# Patient Record
Sex: MALE | Race: WHITE | NOT HISPANIC OR LATINO | Employment: FULL TIME | URBAN - METROPOLITAN AREA
[De-identification: names, ages, dates, MRNs, and addresses within clinical notes are randomized per-mention and may not be internally consistent; named-entity substitution may affect disease eponyms.]

---

## 2017-01-26 ENCOUNTER — OFFICE VISIT (OUTPATIENT)
Dept: FAMILY MEDICINE CLINIC | Facility: CLINIC | Age: 37
End: 2017-01-26

## 2017-01-26 VITALS
HEART RATE: 76 BPM | SYSTOLIC BLOOD PRESSURE: 110 MMHG | DIASTOLIC BLOOD PRESSURE: 80 MMHG | WEIGHT: 244 LBS | BODY MASS INDEX: 31.32 KG/M2 | RESPIRATION RATE: 16 BRPM | HEIGHT: 74 IN | TEMPERATURE: 98.3 F

## 2017-01-26 DIAGNOSIS — J30.1 NON-SEASONAL ALLERGIC RHINITIS DUE TO POLLEN: ICD-10-CM

## 2017-01-26 DIAGNOSIS — J01.00 SUBACUTE MAXILLARY SINUSITIS: Primary | ICD-10-CM

## 2017-01-26 PROCEDURE — 99214 OFFICE O/P EST MOD 30 MIN: CPT | Performed by: INTERNAL MEDICINE

## 2017-01-26 RX ORDER — AZELASTINE HYDROCHLORIDE, FLUTICASONE PROPIONATE 137; 50 UG/1; UG/1
2 SPRAY, METERED NASAL 2 TIMES DAILY
Qty: 23 G | Refills: 5 | Status: SHIPPED | OUTPATIENT
Start: 2017-01-26 | End: 2018-03-27

## 2017-01-26 RX ORDER — FEXOFENADINE HCL AND PSEUDOEPHEDRINE HCI 60; 120 MG/1; MG/1
1 TABLET, EXTENDED RELEASE ORAL 2 TIMES DAILY
Qty: 30 TABLET | Refills: 1 | Status: SHIPPED | OUTPATIENT
Start: 2017-01-26 | End: 2018-03-27

## 2017-01-26 RX ORDER — AMOXICILLIN AND CLAVULANATE POTASSIUM 875; 125 MG/1; MG/1
1 TABLET, FILM COATED ORAL 2 TIMES DAILY
Qty: 20 TABLET | Refills: 0 | Status: SHIPPED | OUTPATIENT
Start: 2017-01-26 | End: 2017-05-31

## 2017-01-26 NOTE — PROGRESS NOTES
Subjective  Chief complaint is sinus problems  Erasmo Kelley is a 36 y.o. male.     History of Present Illness   Rohit is here today for complaints of sinus problems.  He did have a sinus infection in 2015 treated by Dr. Jacobson.  Since that time he has struggled with intermittent moderate to severe sinus congestion and drainage.  Sometimes this is associated with chills and sweats.  He has again other rounds of antibiotic's including Omnicef which seems to help temporarily.  He has not been good about taking a medicine daily in terms of allergy medicines.  Social history he is a nonsmoker.  He has no prior known history of specific allergens.  The following portions of the patient's history were reviewed and updated as appropriate: allergies, current medications, past medical history, past social history and problem list.    Review of Systems   Constitutional: Positive for chills.   HENT: Positive for congestion, rhinorrhea and sinus pressure. Negative for ear discharge and ear pain.    Respiratory: Negative for cough, chest tightness and shortness of breath.        Objective   Physical Exam   Constitutional: He appears well-developed and well-nourished. No distress.   HENT:   Tympanic membranes are normal.  There is mild cerumen bilaterally which is nonobstructing.  Nasal passages show erythema and dry excoriation as well as congestion.  Oral pharynx shows tonsillar enlargement but no exudate.  Her is some mild tenderness on maxillary palpation.   Eyes: Conjunctivae are normal.   Neck: No thyromegaly present.   Lymphadenopathy:     He has no cervical adenopathy.   Nursing note and vitals reviewed.      Assessment/Plan   Erasmo was seen today for sinus problem.    Diagnoses and all orders for this visit:    Subacute maxillary sinusitis  -     CT Sinus Without Contrast; Future    Non-seasonal allergic rhinitis due to pollen    Other orders  -     amoxicillin-clavulanate (AUGMENTIN) 875-125 MG per tablet; Take 1  tablet by mouth 2 (Two) Times a Day.  -     Azelastine-Fluticasone 137-50 MCG/ACT suspension; 2 sprays into each nostril 2 (Two) Times a Day.  -     fexofenadine-pseudoephedrine (ALLEGRA-D ALLERGY & CONGESTION)  MG per 12 hr tablet; Take 1 tablet by mouth 2 (Two) Times a Day.      Rohit is here today for chronic sinus complaints.  I am going to treat him with some Augmentin as well as Allegra-D and Dymista.  I did encourage him to continue to use the Dymista routinely.  We are going to get a CAT scan of his sinuses and consider whether or not a ear nose and throat are allergy referral is necessary.

## 2017-05-31 ENCOUNTER — OFFICE VISIT (OUTPATIENT)
Dept: FAMILY MEDICINE CLINIC | Facility: CLINIC | Age: 37
End: 2017-05-31

## 2017-05-31 VITALS
HEART RATE: 87 BPM | DIASTOLIC BLOOD PRESSURE: 84 MMHG | HEIGHT: 74 IN | RESPIRATION RATE: 15 BRPM | TEMPERATURE: 98.2 F | WEIGHT: 231 LBS | SYSTOLIC BLOOD PRESSURE: 130 MMHG | BODY MASS INDEX: 29.65 KG/M2

## 2017-05-31 DIAGNOSIS — M75.42 IMPINGEMENT SYNDROME OF LEFT SHOULDER: Primary | ICD-10-CM

## 2017-05-31 PROCEDURE — 99214 OFFICE O/P EST MOD 30 MIN: CPT | Performed by: INTERNAL MEDICINE

## 2017-05-31 RX ORDER — MELOXICAM 15 MG/1
15 TABLET ORAL DAILY
Qty: 30 TABLET | Refills: 0 | Status: SHIPPED | OUTPATIENT
Start: 2017-05-31 | End: 2018-03-27

## 2018-02-08 ENCOUNTER — OFFICE VISIT (OUTPATIENT)
Dept: FAMILY MEDICINE CLINIC | Facility: CLINIC | Age: 38
End: 2018-02-08

## 2018-02-08 VITALS
SYSTOLIC BLOOD PRESSURE: 102 MMHG | HEIGHT: 74 IN | OXYGEN SATURATION: 99 % | DIASTOLIC BLOOD PRESSURE: 78 MMHG | TEMPERATURE: 98.2 F | WEIGHT: 214 LBS | BODY MASS INDEX: 27.46 KG/M2 | HEART RATE: 94 BPM

## 2018-02-08 DIAGNOSIS — R63.1 POLYDIPSIA: ICD-10-CM

## 2018-02-08 DIAGNOSIS — R35.89 POLYURIA: ICD-10-CM

## 2018-02-08 DIAGNOSIS — R63.4 WEIGHT LOSS, UNINTENTIONAL: Primary | ICD-10-CM

## 2018-02-08 LAB
ALBUMIN SERPL-MCNC: 4.7 G/DL (ref 3.5–5.2)
ALBUMIN/GLOB SERPL: 2 G/DL
ALP SERPL-CCNC: 59 U/L (ref 39–117)
ALT SERPL-CCNC: 30 U/L (ref 1–41)
APPEARANCE UR: CLEAR
AST SERPL-CCNC: 15 U/L (ref 1–40)
BACTERIA #/AREA URNS HPF: NORMAL /HPF
BASOPHILS # BLD AUTO: 0.03 10*3/MM3 (ref 0–0.2)
BASOPHILS NFR BLD AUTO: 0.5 % (ref 0–1.5)
BILIRUB SERPL-MCNC: 2.1 MG/DL (ref 0.1–1.2)
BILIRUB UR QL STRIP: NEGATIVE
BUN SERPL-MCNC: 17 MG/DL (ref 6–20)
BUN/CREAT SERPL: 17.5 (ref 7–25)
CALCIUM SERPL-MCNC: 9.8 MG/DL (ref 8.6–10.5)
CASTS URNS MICRO: NORMAL
CHLORIDE SERPL-SCNC: 97 MMOL/L (ref 98–107)
CO2 SERPL-SCNC: 26.9 MMOL/L (ref 22–29)
COLOR UR: YELLOW
CREAT SERPL-MCNC: 0.97 MG/DL (ref 0.76–1.27)
EOSINOPHIL # BLD AUTO: 0.16 10*3/MM3 (ref 0–0.7)
EOSINOPHIL NFR BLD AUTO: 2.8 % (ref 0.3–6.2)
EPI CELLS #/AREA URNS HPF: NORMAL /HPF
ERYTHROCYTE [DISTWIDTH] IN BLOOD BY AUTOMATED COUNT: 13.1 % (ref 11.5–14.5)
GFR SERPLBLD CREATININE-BSD FMLA CKD-EPI: 107 ML/MIN/1.73
GFR SERPLBLD CREATININE-BSD FMLA CKD-EPI: 88 ML/MIN/1.73
GLOBULIN SER CALC-MCNC: 2.4 GM/DL
GLUCOSE SERPL-MCNC: 321 MG/DL (ref 65–99)
GLUCOSE UR QL: (no result)
HBA1C MFR BLD: 12.5 % (ref 4.8–5.6)
HCT VFR BLD AUTO: 44.1 % (ref 40.4–52.2)
HGB BLD-MCNC: 15.6 G/DL (ref 13.7–17.6)
HGB UR QL STRIP: NEGATIVE
IMM GRANULOCYTES # BLD: 0 10*3/MM3 (ref 0–0.03)
IMM GRANULOCYTES NFR BLD: 0 % (ref 0–0.5)
KETONES UR QL STRIP: (no result)
LEUKOCYTE ESTERASE UR QL STRIP: NEGATIVE
LYMPHOCYTES # BLD AUTO: 1.76 10*3/MM3 (ref 0.9–4.8)
LYMPHOCYTES NFR BLD AUTO: 31 % (ref 19.6–45.3)
MCH RBC QN AUTO: 30.7 PG (ref 27–32.7)
MCHC RBC AUTO-ENTMCNC: 35.4 G/DL (ref 32.6–36.4)
MCV RBC AUTO: 86.8 FL (ref 79.8–96.2)
MONOCYTES # BLD AUTO: 0.5 10*3/MM3 (ref 0.2–1.2)
MONOCYTES NFR BLD AUTO: 8.8 % (ref 5–12)
NEUTROPHILS # BLD AUTO: 3.22 10*3/MM3 (ref 1.9–8.1)
NEUTROPHILS NFR BLD AUTO: 56.9 % (ref 42.7–76)
NITRITE UR QL STRIP: NEGATIVE
PH UR STRIP: 6.5 [PH] (ref 5–8)
PLATELET # BLD AUTO: 233 10*3/MM3 (ref 140–500)
POTASSIUM SERPL-SCNC: 4.6 MMOL/L (ref 3.5–5.2)
PROT SERPL-MCNC: 7.1 G/DL (ref 6–8.5)
PROT UR QL STRIP: (no result)
RBC # BLD AUTO: 5.08 10*6/MM3 (ref 4.6–6)
RBC #/AREA URNS HPF: NORMAL /HPF
SODIUM SERPL-SCNC: 138 MMOL/L (ref 136–145)
SP GR UR: (no result) (ref 1–1.03)
T4 FREE SERPL-MCNC: 1.2 NG/DL (ref 0.93–1.7)
TSH SERPL DL<=0.005 MIU/L-ACNC: 3.61 MIU/ML (ref 0.27–4.2)
UROBILINOGEN UR STRIP-MCNC: (no result) MG/DL
WBC # BLD AUTO: 5.67 10*3/MM3 (ref 4.5–10.7)
WBC #/AREA URNS HPF: NORMAL /HPF

## 2018-02-08 PROCEDURE — 99214 OFFICE O/P EST MOD 30 MIN: CPT | Performed by: INTERNAL MEDICINE

## 2018-02-08 NOTE — PROGRESS NOTES
Subjective chief complaint is checkup but also weight loss  Erasmo Kelley is a 37 y.o. male.     History of Present Illness   Rohit is here today for checkup.  I last saw him in January 2017.  At that time his weight was 244 pounds.  Shortly thereafter he began losing his appetite.  His weight now is 214 pounds.  He does report that he is having some increased dry mouth and thirst.  He is urinating more frequently because he is drinking more.  He is getting up several times at night to urinate.  He is also concerned because he passed a kidney stone sometime in July 2017.  There was a initial ultrasound that showed an isoechoic mass on the right kidney.  A follow-up CT scan did not show any mass but did show a subcentimeter renal cyst on the right.  It also showed that he had recently passed a kidney stone.  Pancreas and liver looked okay on this scan.  Prostate gland was not enlarged.  Family history is remarkable for diabetes and thyroid disease.  The following portions of the patient's history were reviewed and updated as appropriate: allergies, current medications, past family history and problem list.    Review of Systems   Constitutional: Positive for appetite change and unexpected weight change.   Endocrine: Positive for polydipsia and polyuria.   Genitourinary: Positive for frequency.       Objective   Physical Exam   Constitutional: He appears well-developed and well-nourished.   HENT:   Head: Normocephalic and atraumatic.   Eyes: Conjunctivae are normal.   Neck: No tracheal deviation present. No thyromegaly present.   Cardiovascular: Normal rate, regular rhythm and normal heart sounds.  Exam reveals no gallop and no friction rub.    No murmur heard.  Pulmonary/Chest: Effort normal and breath sounds normal. No respiratory distress. He has no wheezes. He has no rales.   Abdominal: Soft. Bowel sounds are normal. He exhibits no distension and no mass. There is no tenderness. There is no rebound and no  guarding.   Musculoskeletal: He exhibits no edema.   Lymphadenopathy:     He has no cervical adenopathy.   Psychiatric: He has a normal mood and affect.   Nursing note and vitals reviewed.      Assessment/Plan   Erasmo was seen today for follow-up.    Diagnoses and all orders for this visit:    Weight loss, unintentional  -     CBC & Differential  -     Comprehensive Metabolic Panel  -     TSH+Free T4  -     Hemoglobin A1c  -     Urinalysis With Microscopic - Urine, Clean Catch    Polydipsia  -     Comprehensive Metabolic Panel  -     Hemoglobin A1c  -     Urinalysis With Microscopic - Urine, Clean Catch    Polyuria  -     Comprehensive Metabolic Panel  -     Hemoglobin A1c  -     Urinalysis With Microscopic - Urine, Clean Catch      Rohit is here today for some unintended weight loss and other symptoms that could be consistent with diabetes.  We are going to check on his chemistries as well as a hemoglobin A1c.  I'm also going to check a thyroid.  I'm going to check a urinalysis because of his frequency of urination.  I do not think the cyst on the kidney is anything to worry about.

## 2018-02-22 ENCOUNTER — OFFICE VISIT (OUTPATIENT)
Dept: FAMILY MEDICINE CLINIC | Facility: CLINIC | Age: 38
End: 2018-02-22

## 2018-02-22 VITALS
SYSTOLIC BLOOD PRESSURE: 98 MMHG | OXYGEN SATURATION: 98 % | WEIGHT: 215 LBS | HEIGHT: 74 IN | TEMPERATURE: 98.2 F | HEART RATE: 89 BPM | BODY MASS INDEX: 27.59 KG/M2 | DIASTOLIC BLOOD PRESSURE: 78 MMHG

## 2018-02-22 DIAGNOSIS — E11.9 DIABETES MELLITUS, NEW ONSET (HCC): Primary | ICD-10-CM

## 2018-02-22 PROCEDURE — 99213 OFFICE O/P EST LOW 20 MIN: CPT | Performed by: INTERNAL MEDICINE

## 2018-02-22 RX ORDER — BLOOD-GLUCOSE METER
KIT MISCELLANEOUS
Refills: 0 | COMMUNITY
Start: 2018-02-09 | End: 2018-03-27 | Stop reason: ALTCHOICE

## 2018-02-22 NOTE — PROGRESS NOTES
Subjective chief complaint is follow-up for new onset diabetes  Erasmo Kelley is a 37 y.o. male.     History of Present Illness   Rohit is here today for follow-up.  It is been 2 weeks since we started him on some Janumet XR.  He has been trying to watch his diet closely.  He has been checking his sugars multiple times daily.  His morning sugars seem to be approximately 160.  Most of his after meal sugars seem to be near 200.  A1c initially was 12.3.  He cannot recall any type of episode of abdominal pain.  He was in the emergency room for a kidney stone in July 2017.  His blood sugar at that time was 99.  His urinalysis showed no sugar in the urine.  The CAT scan of the abdomen showed a normal-appearing pancreas.  The following portions of the patient's history were reviewed and updated as appropriate: allergies, current medications, past medical history and problem list.    Review of Systems   Eyes: Positive for visual disturbance.   Gastrointestinal: Negative for abdominal pain and diarrhea.       Objective   Physical Exam   Constitutional: He appears well-developed and well-nourished.   Abdominal: Soft. Bowel sounds are normal. He exhibits no distension. There is tenderness.   He is tender in the midepigastrium.  No rebounding or guarding.   Nursing note and vitals reviewed.      Assessment/Plan   Erasmo was seen today for follow-up.    Diagnoses and all orders for this visit:    Diabetes mellitus, new onset  -     Ambulatory Referral to Diabetic Education  -     Ambulatory Referral to Endocrinology  -     C-Peptide  -     Glutamic Acid Decarboxylase  -     Insulin, Random  -     Anti-islet Cell Antibody  -     CT Abdomen Pelvis With Contrast; Future    Other orders  -     SITagliptin-MetFORMIN HCl ER (JANUMET XR) 100-1000 MG tablet; Take 1 tablet by mouth Daily.      Rohit is here today for follow-up.  His sugars seem to have come down initially fairly nicely.  I suspect we need to give him a little bit  more time on the current dose of medication.  I am going to refer him to a diabetic educator and also an endocrinologist.  In the interim I'm going to check some antibody studies and a CAT scan of the abdomen and pelvis.

## 2018-02-28 ENCOUNTER — TELEPHONE (OUTPATIENT)
Dept: FAMILY MEDICINE CLINIC | Facility: CLINIC | Age: 38
End: 2018-02-28

## 2018-02-28 ENCOUNTER — HOSPITAL ENCOUNTER (OUTPATIENT)
Dept: CT IMAGING | Facility: HOSPITAL | Age: 38
Discharge: HOME | End: 2018-02-28
Attending: INTERNAL MEDICINE | Admitting: INTERNAL MEDICINE

## 2018-02-28 DIAGNOSIS — E11.9 DIABETES MELLITUS, NEW ONSET (HCC): ICD-10-CM

## 2018-02-28 LAB
C PEPTIDE SERPL-MCNC: 2 NG/ML (ref 1.1–4.4)
GAD65 AB SER IA-ACNC: 689.4 U/ML (ref 0–5)
INSULIN SERPL-ACNC: 4 UIU/ML
PANC ISLET CELL AB TITR SER: NEGATIVE {TITER}

## 2018-02-28 PROCEDURE — 0 IOPAMIDOL 61 % SOLUTION: Performed by: INTERNAL MEDICINE

## 2018-02-28 PROCEDURE — 74177 CT ABD & PELVIS W/CONTRAST: CPT

## 2018-02-28 PROCEDURE — 0 DIATRIZOATE MEGLUMINE & SODIUM PER 1 ML: Performed by: INTERNAL MEDICINE

## 2018-02-28 RX ADMIN — DIATRIZOATE MEGLUMINE AND DIATRIZOATE SODIUM 30 ML: 660; 100 LIQUID ORAL; RECTAL at 08:45

## 2018-02-28 RX ADMIN — IOPAMIDOL 85 ML: 612 INJECTION, SOLUTION INTRAVENOUS at 09:40

## 2018-02-28 NOTE — TELEPHONE ENCOUNTER
----- Message from Deepthi Andrea sent at 2/28/2018  9:56 AM EST -----  PATIENT CALLED, HAD A CAT SCAN THIS MORNING, THE RADIOLOGIST GAVE HIM A PIECE OF PAPER SAYING THAT BECAUSE HE TAKES JANUMET - HE NEEDS TO TALK TO HIS PCP ABOUT IT CAUSE HE MAY NEED TO BE OFF THE MED FOR 2 DAYS DUE TO THE DYE THEY USE DURING THE TESTS. PLEASE RETURN HIS CALL       [phone] PATIENT CELL   Advised that was okay to be off the Janumet for 2 days.

## 2018-03-27 ENCOUNTER — OFFICE VISIT (OUTPATIENT)
Dept: ENDOCRINOLOGY | Age: 38
End: 2018-03-27

## 2018-03-27 VITALS
SYSTOLIC BLOOD PRESSURE: 100 MMHG | WEIGHT: 217.6 LBS | OXYGEN SATURATION: 98 % | BODY MASS INDEX: 27.93 KG/M2 | HEART RATE: 74 BPM | HEIGHT: 74 IN | DIASTOLIC BLOOD PRESSURE: 66 MMHG

## 2018-03-27 DIAGNOSIS — E10.9 AUTOIMMUNE DIABETES (HCC): Primary | ICD-10-CM

## 2018-03-27 PROCEDURE — 99204 OFFICE O/P NEW MOD 45 MIN: CPT | Performed by: INTERNAL MEDICINE

## 2018-03-27 RX ORDER — CHLORHEXIDINE GLUCONATE 0.12 MG/ML
RINSE ORAL
Refills: 0 | COMMUNITY
Start: 2018-03-14 | End: 2018-03-27

## 2018-03-27 NOTE — PROGRESS NOTES
Subjective   Erasmo Kelley is a 37 y.o. male.     New pt ref by dr Ravi Cox for dm / testing bs 4x day has been out of test strips  / no recent last dm eye exam  / last dm foot exam today with dr Tomlinson       Diabetes        Patient is a 37-year-old male referred for diabetic consultation by Dr. Cox.    He was diagnosed to have diabetes mellitus in February 2018 with the 5-8 month history of increased thirst, polyuria, and weight loss of 35 pounds.  Random glucose done in February 2018 was elevated at 321 and hemoglobin A1c was 12.50%.  DOMINGO antibody was elevated at 689 units per mL.    He was started on Janumet /1000 mg once a day in February 2018.  Fasting glucose at home has ranged from 120-130.  Random glucose runs less than 190 except on days when he goes off his diet.  He has not seen a dietitian and diabetes educator.  He has not lost any more weight since Janumet was started.    His vision is no longer blurry.  His last eye examination was many years ago.  He denies any numbness, tingling or burning sensation in his hands or feet.    He has no history of hypertension or hyperlipidemia.  His last meal was at 11 AM.    He does not smoke cigarettes.  He drinks 2-25 beers per week depending on social medications.  He denies alcohol-related disease.  He denies drug abuse.  The following portions of the patient's history were reviewed and updated as appropriate: allergies, current medications, past family history, past medical history, past social history, past surgical history and problem list.    Review of Systems   Constitutional: Negative.    HENT: Negative.    Eyes: Negative.    Respiratory: Negative.    Cardiovascular: Negative.    Gastrointestinal: Negative.    Endocrine: Negative.    Genitourinary: Negative.    Musculoskeletal: Negative.    Skin: Negative.    Allergic/Immunologic: Negative.    Neurological: Negative.    Hematological: Negative.    Psychiatric/Behavioral: Negative.   "      Objective      Vitals:    03/27/18 1250   BP: 100/66   BP Location: Right arm   Patient Position: Sitting   Cuff Size: Large Adult   Pulse: 74   SpO2: 98%   Weight: 98.7 kg (217 lb 9.6 oz)   Height: 188 cm (74.02\")     Physical Exam   Constitutional: He is oriented to person, place, and time. He appears well-developed and well-nourished. No distress.   HENT:   Head: Normocephalic.   Nose: Nose normal.   Mouth/Throat: No oropharyngeal exudate.   Eyes: Conjunctivae and EOM are normal. Right eye exhibits no discharge. Left eye exhibits no discharge. No scleral icterus.   Neck: Normal range of motion. No JVD present. No tracheal deviation present. No thyromegaly present.   Cardiovascular: Normal rate, regular rhythm, normal heart sounds and intact distal pulses.  Exam reveals no gallop and no friction rub.    No murmur heard.  Pulmonary/Chest: Effort normal and breath sounds normal. No respiratory distress. He has no wheezes. He has no rales.   Abdominal: Soft. Bowel sounds are normal. He exhibits no distension. There is no tenderness. There is no guarding.   Musculoskeletal: Normal range of motion. He exhibits no edema, tenderness or deformity.   Lymphadenopathy:     He has no cervical adenopathy.   Neurological: He is alert and oriented to person, place, and time. He has normal reflexes.   Intact light touch   Skin: Skin is warm and dry.   Psychiatric: He has a normal mood and affect. His behavior is normal.     Office Visit on 02/22/2018   Component Date Value Ref Range Status   • C-Peptide 02/28/2018 2.0  1.1 - 4.4 ng/mL Final   • DOMINGO-65 02/28/2018 689.4* 0.0 - 5.0 U/mL Final   • Insulin 02/28/2018 4.0  uIU/mL Final    Comment: Reference Range:  Pubertal Children and Adults (fasting):  < or = 17     • Islet Cell Ab 02/28/2018 Negative  Neg:<1:1 Final     Assessment/Plan   Erasmo was seen today for diabetes.    Diagnoses and all orders for this visit:    Autoimmune diabetes  -     Comprehensive Metabolic " Panel  -     Lipid Panel  -     Microalbumin / Creatinine Urine Ratio - Urine, Clean Catch  -     Fructosamine  -     glucose blood test strip; One touch Verio Strips.  Use QID  -     Ambulatory Referral to Diabetic Education    Other orders  -     Discontinue: ONE TOUCH ULTRA TEST test strip; Dx code E11.9 testing 4 x day  -     ONETOUCH DELICA LANCETS FINE misc; Dx code E11.9 Testing bs 4 x day      Patient has type 1 diabetes with residual insulin secretion.    Appointment with diabetes educator to instruct on insulin use  Continue on Janumet XR for now.  He will eventually need to switch to insulin.  Check Fructosamine, C-peptide, and urine microalbumin.  Advised to have eye exam.  He has been advised to reduce beer intake to less than 4 per day.    RTC 3 mos    Send copy of my note and labs to Dr. Cox

## 2018-03-28 LAB
ALBUMIN SERPL-MCNC: 4.7 G/DL (ref 3.5–5.2)
ALBUMIN/CREAT UR: <3.3 MG/G CREAT (ref 0–30)
ALBUMIN/GLOB SERPL: 1.9 G/DL
ALP SERPL-CCNC: 42 U/L (ref 39–117)
ALT SERPL-CCNC: 19 U/L (ref 1–41)
AST SERPL-CCNC: 15 U/L (ref 1–40)
BILIRUB SERPL-MCNC: 1.3 MG/DL (ref 0.1–1.2)
BUN SERPL-MCNC: 15 MG/DL (ref 6–20)
BUN/CREAT SERPL: 14.7 (ref 7–25)
CALCIUM SERPL-MCNC: 10.1 MG/DL (ref 8.6–10.5)
CHLORIDE SERPL-SCNC: 101 MMOL/L (ref 98–107)
CHOLEST SERPL-MCNC: 206 MG/DL (ref 0–200)
CO2 SERPL-SCNC: 25.2 MMOL/L (ref 22–29)
CREAT SERPL-MCNC: 1.02 MG/DL (ref 0.76–1.27)
CREAT UR-MCNC: 91.5 MG/DL
FRUCTOSAMINE SERPL-SCNC: 320 UMOL/L (ref 0–285)
GFR SERPLBLD CREATININE-BSD FMLA CKD-EPI: 101 ML/MIN/1.73
GFR SERPLBLD CREATININE-BSD FMLA CKD-EPI: 83 ML/MIN/1.73
GLOBULIN SER CALC-MCNC: 2.5 GM/DL
GLUCOSE SERPL-MCNC: 112 MG/DL (ref 65–99)
HDLC SERPL-MCNC: 47 MG/DL (ref 40–60)
INTERPRETATION: NORMAL
LDLC SERPL CALC-MCNC: 128 MG/DL (ref 0–100)
MICROALBUMIN UR-MCNC: <3 UG/ML
POTASSIUM SERPL-SCNC: 4.2 MMOL/L (ref 3.5–5.2)
PROT SERPL-MCNC: 7.2 G/DL (ref 6–8.5)
SODIUM SERPL-SCNC: 142 MMOL/L (ref 136–145)
TRIGL SERPL-MCNC: 154 MG/DL (ref 0–150)
VLDLC SERPL CALC-MCNC: 30.8 MG/DL (ref 5–40)

## 2018-04-20 ENCOUNTER — OFFICE VISIT (OUTPATIENT)
Dept: FAMILY MEDICINE CLINIC | Facility: CLINIC | Age: 38
End: 2018-04-20

## 2018-04-20 VITALS
WEIGHT: 215.4 LBS | TEMPERATURE: 97.7 F | OXYGEN SATURATION: 100 % | RESPIRATION RATE: 16 BRPM | HEART RATE: 92 BPM | HEIGHT: 74 IN | BODY MASS INDEX: 27.64 KG/M2

## 2018-04-20 DIAGNOSIS — J01.00 SUBACUTE MAXILLARY SINUSITIS: Primary | ICD-10-CM

## 2018-04-20 PROCEDURE — 99213 OFFICE O/P EST LOW 20 MIN: CPT | Performed by: FAMILY MEDICINE

## 2018-04-20 RX ORDER — FLUTICASONE PROPIONATE 50 MCG
2 SPRAY, SUSPENSION (ML) NASAL DAILY
Qty: 15.8 ML | Refills: 1 | Status: SHIPPED | OUTPATIENT
Start: 2018-04-20 | End: 2018-08-14

## 2018-04-20 RX ORDER — AMOXICILLIN 500 MG/1
500 CAPSULE ORAL 3 TIMES DAILY
Qty: 30 CAPSULE | Refills: 0 | Status: SHIPPED | OUTPATIENT
Start: 2018-04-20 | End: 2018-08-14

## 2018-04-20 NOTE — PROGRESS NOTES
HPI  Erasmo Kelley is a 38 y.o. male who is here for 2 week history of sinus pain and congestion especially above and below the left eye.  Has been using Allegra-D as well as some old Flonase.  Yesterday pain was very severe and last night blew out discolored mucus with blood.  Has also been taking ibuprofen as needed for headache etc.       Review of Systems   Constitutional: Positive for fatigue. Negative for chills, diaphoresis and fever.   HENT: Positive for congestion, sinus pain and sinus pressure. Negative for sore throat and trouble swallowing.    Respiratory: Negative for cough and shortness of breath.    Neurological: Positive for headaches.         No past medical history on file.    Past Surgical History:   Procedure Laterality Date   • APPENDECTOMY     • HERNIA REPAIR     • WISDOM TOOTH EXTRACTION         Family History   Problem Relation Age of Onset   • Thyroid disease Mother    • Diabetes Father    • Heart disease Father    • Hypertension Father    • Hyperlipidemia Father    • Hyperlipidemia Brother    • Heart disease Brother    • Diabetes Paternal Grandmother        Social History     Social History   • Marital status:      Spouse name: N/A   • Number of children: N/A   • Years of education: N/A     Occupational History   • mechanical contractor      Social History Main Topics   • Smoking status: Never Smoker   • Smokeless tobacco: Not on file   • Alcohol use Yes      Comment: 2-30 beers/ week    • Drug use: No   • Sexual activity: Not on file     Other Topics Concern   • Not on file     Social History Narrative   • No narrative on file         Physical Exam   Constitutional: He is oriented to person, place, and time. He appears well-developed and well-nourished.   HENT:   Head: Normocephalic and atraumatic.   Right Ear: Hearing, tympanic membrane and ear canal normal.   Left Ear: Hearing, tympanic membrane and ear canal normal.   Nose: No mucosal edema. No epistaxis. Left sinus exhibits  maxillary sinus tenderness.   Eyes: EOM are normal. Pupils are equal, round, and reactive to light.   Neck: Normal range of motion. Neck supple.   Cardiovascular: Normal rate.    Pulmonary/Chest: Effort normal. No respiratory distress.   Lymphadenopathy:     He has no cervical adenopathy.   Neurological: He is alert and oriented to person, place, and time.   Skin: Skin is warm and dry. No rash noted.   Psychiatric: He has a normal mood and affect. His behavior is normal. Judgment and thought content normal.   Nursing note and vitals reviewed.        Assessment/Plan    Erasmo was seen today for sinus problem.    Diagnoses and all orders for this visit:    Subacute maxillary sinusitis  -     fluticasone (FLONASE) 50 MCG/ACT nasal spray; 2 sprays into each nostril Daily.  -     amoxicillin (AMOXIL) 500 MG capsule; Take 1 capsule by mouth 3 (Three) Times a Day.      Patient presents with probable left maxillary and possible frontal sinusitis.  Reports discolored bloody nasal discharge and severe pain yesterday though much better today.  Patient is diabetic.  Will give empiric antibiotic therapy for acute sinusitis and recommend continuing Flonase nasal spray.  Call if worsens or if not significantly improved by next week.    This note includes information entered using a voice recognition dictation system.  Though reviewed, some nonsensible errors may remain.

## 2018-08-14 ENCOUNTER — OFFICE VISIT (OUTPATIENT)
Dept: ENDOCRINOLOGY | Age: 38
End: 2018-08-14

## 2018-08-14 VITALS
WEIGHT: 216 LBS | HEART RATE: 83 BPM | HEIGHT: 74 IN | BODY MASS INDEX: 27.72 KG/M2 | OXYGEN SATURATION: 97 % | DIASTOLIC BLOOD PRESSURE: 72 MMHG | SYSTOLIC BLOOD PRESSURE: 108 MMHG

## 2018-08-14 DIAGNOSIS — E10.9 AUTOIMMUNE DIABETES (HCC): Primary | ICD-10-CM

## 2018-08-14 PROCEDURE — 99214 OFFICE O/P EST MOD 30 MIN: CPT | Performed by: INTERNAL MEDICINE

## 2018-08-14 NOTE — PROGRESS NOTES
Subjective   Erasmo Kelley is a 38 y.o. male.     F/u for dm 1/testing bs 2-4  x day / pt has not had a recent eye exam/ last dm foot exam 3/2718 with dr Tomlinson       Diabetes           Patient is a 37-year-old male referred for diabetic consultation by Dr. Cox.     He was diagnosed to have diabetes mellitus in February 2018 with the 5-8 month history of increased thirst, polyuria, and weight loss of 35 pounds.  Random glucose done in February 2018 was elevated at 321 and hemoglobin A1c was 12.50%.  DOMINGO antibody was elevated at 689 units per mL.     He was started on Janumet /1000 mg once a day in February 2018.  Fasting glucose at home has ranged from 113-143.  He has not seen a dietitian and diabetes educator.  He has not lost any more weight since Janumet was started.     His vision is no longer blurry.  His last eye examination was many years ago.  He denies any numbness, tingling or burning sensation in his hands or feet.  Urine microalbumin was normal in March 2018.     He has no history of hypertension.   He has hyperlipidemia and is on diet alone.  His last meal was at 1 PM.     He does not smoke cigarettes.  He drinks 2-25 beers per week depending on social medications.  He denies alcohol-related disease.  He denies drug abuse.    The following portions of the patient's history were reviewed and updated as appropriate: allergies, current medications, past family history, past medical history, past social history, past surgical history and problem list.    Review of Systems   Constitutional: Negative.    HENT: Negative.    Eyes: Negative.    Respiratory: Negative.    Cardiovascular: Negative.    Gastrointestinal: Negative.    Endocrine: Negative.    Genitourinary: Negative.    Musculoskeletal: Negative.    Skin: Negative.    Allergic/Immunologic: Negative.    Neurological: Negative.    Hematological: Negative.    Psychiatric/Behavioral: Negative.        Objective      Vitals:    08/14/18 1419  "  BP: 108/72   BP Location: Right arm   Patient Position: Sitting   Cuff Size: Large Adult   Pulse: 83   SpO2: 97%   Weight: 98 kg (216 lb)   Height: 188 cm (74.02\")     Physical Exam   Constitutional: He is oriented to person, place, and time. He appears well-developed and well-nourished. No distress.   HENT:   Head: Normocephalic.   Nose: Nose normal.   Mouth/Throat: No oropharyngeal exudate.   Eyes: Conjunctivae and EOM are normal. Right eye exhibits no discharge. Left eye exhibits no discharge.   Neck: Neck supple. No JVD present. No tracheal deviation present. No thyromegaly present.   Cardiovascular: Normal rate, regular rhythm, normal heart sounds and intact distal pulses.  Exam reveals no gallop and no friction rub.    No murmur heard.  Pulmonary/Chest: Effort normal and breath sounds normal. No respiratory distress. He has no wheezes. He has no rales.   Abdominal: Soft. Bowel sounds are normal. He exhibits no distension and no mass. There is no tenderness. There is no guarding.   Musculoskeletal: Normal range of motion. He exhibits no edema, tenderness or deformity.   Lymphadenopathy:     He has no cervical adenopathy.   Neurological: He is alert and oriented to person, place, and time. He displays normal reflexes. He exhibits normal muscle tone.   Skin: Skin is warm and dry. No rash noted. No erythema.   Psychiatric: He has a normal mood and affect. His behavior is normal.     Office Visit on 03/27/2018   Component Date Value Ref Range Status   • Glucose 03/27/2018 112* 65 - 99 mg/dL Final   • BUN 03/27/2018 15  6 - 20 mg/dL Final   • Creatinine 03/27/2018 1.02  0.76 - 1.27 mg/dL Final   • eGFR Non  Am 03/27/2018 83  >60 mL/min/1.73 Final   • eGFR African Am 03/27/2018 101  >60 mL/min/1.73 Final   • BUN/Creatinine Ratio 03/27/2018 14.7  7.0 - 25.0 Final   • Sodium 03/27/2018 142  136 - 145 mmol/L Final   • Potassium 03/27/2018 4.2  3.5 - 5.2 mmol/L Final   • Chloride 03/27/2018 101  98 - 107 mmol/L " Final   • Total CO2 03/27/2018 25.2  22.0 - 29.0 mmol/L Final   • Calcium 03/27/2018 10.1  8.6 - 10.5 mg/dL Final   • Total Protein 03/27/2018 7.2  6.0 - 8.5 g/dL Final   • Albumin 03/27/2018 4.70  3.50 - 5.20 g/dL Final   • Globulin 03/27/2018 2.5  gm/dL Final   • A/G Ratio 03/27/2018 1.9  g/dL Final   • Total Bilirubin 03/27/2018 1.3* 0.1 - 1.2 mg/dL Final   • Alkaline Phosphatase 03/27/2018 42  39 - 117 U/L Final   • AST (SGOT) 03/27/2018 15  1 - 40 U/L Final   • ALT (SGPT) 03/27/2018 19  1 - 41 U/L Final   • Total Cholesterol 03/27/2018 206* 0 - 200 mg/dL Final   • Triglycerides 03/27/2018 154* 0 - 150 mg/dL Final   • HDL Cholesterol 03/27/2018 47  40 - 60 mg/dL Final   • VLDL Cholesterol 03/27/2018 30.8  5 - 40 mg/dL Final   • LDL Cholesterol  03/27/2018 128* 0 - 100 mg/dL Final   • Creatinine, Urine 03/27/2018 91.5  Not Estab. mg/dL Final   • Microalbumin, Urine 03/27/2018 <3.0  Not Estab. ug/mL Final   • Microalbumin/Creatinine Ratio 03/27/2018 <3.3  0.0 - 30.0 mg/g creat Final   • Fructosamine 03/27/2018 320* 0 - 285 umol/L Final    Comment: Published reference interval for apparently healthy subjects  between age 20 and 60 is 205 - 285 umol/L and in a poorly  controlled diabetic population is 228 - 563 umol/L with a  mean of 396 umol/L.     • Interpretation 03/27/2018 Note   Final    Supplemental report is available.     Assessment/Plan   Erasmo was seen today for diabetes.    Diagnoses and all orders for this visit:    Autoimmune diabetes (CMS/[facility])  -     Comprehensive Metabolic Panel  -     Hemoglobin A1c  -     IA-2 Autoantibodies  -     Anti-islet Cell Antibody  -     Insulin Antibody      Continue Janumet XR.  Patient has autoimmune diabetes and may eventually stop making insulin and switch over to injectable insulin.  Flu vaccine this fall    Send copy my notes and labs to Dr. Cox.    RTC 4 mos.

## 2018-08-22 PROBLEM — E13.9 LADA (LATENT AUTOIMMUNE DIABETES MELLITUS IN ADULTS) (HCC): Status: ACTIVE | Noted: 2018-02-22

## 2018-08-22 LAB
ALBUMIN SERPL-MCNC: 4.8 G/DL (ref 3.5–5.2)
ALBUMIN/GLOB SERPL: 1.9 G/DL
ALP SERPL-CCNC: 43 U/L (ref 39–117)
ALT SERPL-CCNC: 21 U/L (ref 1–41)
AST SERPL-CCNC: 13 U/L (ref 1–40)
BILIRUB SERPL-MCNC: 1.4 MG/DL (ref 0.1–1.2)
BUN SERPL-MCNC: 17 MG/DL (ref 6–20)
BUN/CREAT SERPL: 12 (ref 7–25)
CALCIUM SERPL-MCNC: 9.3 MG/DL (ref 8.6–10.5)
CHLORIDE SERPL-SCNC: 99 MMOL/L (ref 98–107)
CO2 SERPL-SCNC: 26.1 MMOL/L (ref 22–29)
CREAT SERPL-MCNC: 1.42 MG/DL (ref 0.76–1.27)
GLOBULIN SER CALC-MCNC: 2.5 GM/DL
GLUCOSE SERPL-MCNC: 99 MG/DL (ref 65–99)
HBA1C MFR BLD: 5.73 % (ref 4.8–5.6)
INSULIN AB SER-ACNC: <5 UU/ML
ISLET CELL512 AB SER-ACNC: 4.4 U/ML
PANC ISLET CELL AB TITR SER: NEGATIVE {TITER}
POTASSIUM SERPL-SCNC: 4.3 MMOL/L (ref 3.5–5.2)
PROT SERPL-MCNC: 7.3 G/DL (ref 6–8.5)
SODIUM SERPL-SCNC: 141 MMOL/L (ref 136–145)

## 2018-08-24 DIAGNOSIS — R79.89 ELEVATED SERUM CREATININE: Primary | ICD-10-CM

## 2018-08-27 ENCOUNTER — RESULTS ENCOUNTER (OUTPATIENT)
Dept: ENDOCRINOLOGY | Age: 38
End: 2018-08-27

## 2018-08-27 DIAGNOSIS — R79.89 ELEVATED SERUM CREATININE: ICD-10-CM

## 2018-09-17 RX ORDER — SITAGLIPTIN AND METFORMIN HYDROCHLORIDE 1000; 100 MG/1; MG/1
TABLET, FILM COATED, EXTENDED RELEASE ORAL
Qty: 30 TABLET | Refills: 0 | Status: SHIPPED | OUTPATIENT
Start: 2018-09-17 | End: 2018-11-27 | Stop reason: SDUPTHER

## 2018-11-30 DIAGNOSIS — E10.9 AUTOIMMUNE DIABETES (HCC): ICD-10-CM

## 2019-01-31 ENCOUNTER — OFFICE VISIT (OUTPATIENT)
Dept: ENDOCRINOLOGY | Age: 39
End: 2019-01-31

## 2019-01-31 VITALS
HEIGHT: 74 IN | HEART RATE: 84 BPM | SYSTOLIC BLOOD PRESSURE: 102 MMHG | OXYGEN SATURATION: 98 % | WEIGHT: 220.6 LBS | DIASTOLIC BLOOD PRESSURE: 76 MMHG | BODY MASS INDEX: 28.31 KG/M2

## 2019-01-31 DIAGNOSIS — E10.9 AUTOIMMUNE DIABETES (HCC): Primary | ICD-10-CM

## 2019-01-31 DIAGNOSIS — E10.9 TYPE 1 DIABETES MELLITUS WITHOUT COMPLICATION (HCC): ICD-10-CM

## 2019-01-31 PROCEDURE — 99214 OFFICE O/P EST MOD 30 MIN: CPT | Performed by: INTERNAL MEDICINE

## 2019-01-31 RX ORDER — PEN NEEDLE, DIABETIC 30 GX3/16"
1 NEEDLE, DISPOSABLE MISCELLANEOUS 4 TIMES DAILY
Qty: 200 EACH | Refills: 3 | Status: SHIPPED | OUTPATIENT
Start: 2019-01-31 | End: 2019-04-16 | Stop reason: SDUPTHER

## 2019-01-31 NOTE — PROGRESS NOTES
Subjective   Erasmo Kelley is a 38 y.o. male.     F/u for dm 1/ testing bs 2-4 x day / no recent eye exam / last dm foot exam today with dr Christy / flu vaccine declined       Diabetes   He presents for his follow-up diabetic visit. He has type 1 diabetes mellitus.      Patient is a 38-year-old male referred for diabetic consultation by Dr. Cox.     He was diagnosed to have diabetes mellitus in February 2018 with the 5-8 month history of increased thirst, polyuria, and weight loss of 35 pounds.  Random glucose done in February 2018 was elevated at 321 and hemoglobin A1c was 12.50%.  DOMINGO antibody was elevated at 689 units per mL.     He was started on Janumet /1000 mg once a day in February 2018.  Blood sugars started running higher 2 months ago.  Fasting glucose at home has ranged from 196-216.  -290.  He has not seen a dietitian and diabetes educator.  He has gained 2 lbs since 8/18     His vision is no longer blurry.  He has had an eye exam.   He denies any numbness, tingling or burning sensation in his hands or feet.  Urine microalbumin was normal in March 2018.     He has no history of hypertension.   He has hyperlipidemia and is on diet alone.  His last meal was at 1 PM.     He does not smoke cigarettes.  He drinks 6-8 beers per week depending on social occasion.  He denies alcohol-related disease.  He denies drug abuse.    The following portions of the patient's history were reviewed and updated as appropriate: allergies, current medications, past family history, past medical history, past social history, past surgical history and problem list.    Review of Systems   Constitutional: Negative.    Eyes: Negative.    Respiratory: Negative.    Cardiovascular: Negative.    Gastrointestinal: Negative.    Endocrine: Negative.    Genitourinary: Negative.    Musculoskeletal: Negative.    Skin: Negative.    Allergic/Immunologic: Negative.    Neurological: Negative.    Hematological: Negative.   "  Psychiatric/Behavioral: Negative.        Objective      Vitals:    01/31/19 1432   BP: 102/76   BP Location: Left arm   Patient Position: Sitting   Cuff Size: Large Adult   Pulse: 84   SpO2: 98%   Weight: 100 kg (220 lb 9.6 oz)   Height: 188 cm (74.02\")     Physical Exam   Constitutional: He is oriented to person, place, and time. He appears well-developed and well-nourished. No distress.   HENT:   Head: Normocephalic.   Nose: Nose normal.   Mouth/Throat: Oropharynx is clear and moist. No oropharyngeal exudate.   Eyes: Conjunctivae and EOM are normal. Right eye exhibits no discharge. Left eye exhibits no discharge. No scleral icterus.   Neck: Neck supple. No JVD present. No tracheal deviation present. No thyromegaly present.   Cardiovascular: Normal rate, regular rhythm, normal heart sounds and intact distal pulses. Exam reveals no gallop and no friction rub.   No murmur heard.  Pulmonary/Chest: Effort normal and breath sounds normal. No stridor. No respiratory distress. He has no wheezes. He has no rales. He exhibits no tenderness.   Abdominal: Soft. Bowel sounds are normal. He exhibits no distension and no mass. There is no tenderness. There is no rebound and no guarding.   Musculoskeletal: Normal range of motion. He exhibits no edema, tenderness or deformity.   Lymphadenopathy:     He has no cervical adenopathy.   Neurological: He is alert and oriented to person, place, and time. He displays normal reflexes. He exhibits normal muscle tone. Coordination normal.   Intact light touch on lower ext   Skin: Skin is warm.   Psychiatric: He has a normal mood and affect. His behavior is normal.     Office Visit on 08/14/2018   Component Date Value Ref Range Status   • Glucose 08/14/2018 99  65 - 99 mg/dL Final   • BUN 08/14/2018 17  6 - 20 mg/dL Final   • Creatinine 08/14/2018 1.42* 0.76 - 1.27 mg/dL Final   • eGFR Non  Am 08/14/2018 56* >60 mL/min/1.73 Final   • eGFR African Am 08/14/2018 68  >60 mL/min/1.73 " Final   • BUN/Creatinine Ratio 08/14/2018 12.0  7.0 - 25.0 Final   • Sodium 08/14/2018 141  136 - 145 mmol/L Final   • Potassium 08/14/2018 4.3  3.5 - 5.2 mmol/L Final   • Chloride 08/14/2018 99  98 - 107 mmol/L Final   • Total CO2 08/14/2018 26.1  22.0 - 29.0 mmol/L Final   • Calcium 08/14/2018 9.3  8.6 - 10.5 mg/dL Final   • Total Protein 08/14/2018 7.3  6.0 - 8.5 g/dL Final   • Albumin 08/14/2018 4.80  3.50 - 5.20 g/dL Final   • Globulin 08/14/2018 2.5  gm/dL Final   • A/G Ratio 08/14/2018 1.9  g/dL Final   • Total Bilirubin 08/14/2018 1.4* 0.1 - 1.2 mg/dL Final   • Alkaline Phosphatase 08/14/2018 43  39 - 117 U/L Final   • AST (SGOT) 08/14/2018 13  1 - 40 U/L Final   • ALT (SGPT) 08/14/2018 21  1 - 41 U/L Final   • Hemoglobin A1C 08/14/2018 5.73* 4.80 - 5.60 % Final    Comment: Hemoglobin A1C Ranges:  Increased Risk for Diabetes  5.7% to 6.4%  Diabetes                     >= 6.5%  Diabetic Goal                < 7.0%     • IA-2 Autoantibodies 08/14/2018 4.4* U/mL Final    Comment: Reference Range:  <1.0        Negative  > or = 1.0  Positive     • Islet Cell Ab 08/14/2018 Negative  Neg:<1:1 Final   • Insulin AutoAb 08/14/2018 <5.0  uU/mL Final    Comment: This test is also known as insulin autoantibody or IAA.  Reference Range:  <5.0        Negative  > or = 5.0  Positive       Assessment/Plan   Erasmo was seen today for diabetes.    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  -     Insulin Pen Needle (PEN NEEDLES) 30G X 5 MM misc; 1  each 4 (Four) Times a Day.      Discontinue Janumet XR.  Start Tresiba 20 units once a day and NovoLog 5 units with each meal.  Check blood sugar before meals and at bedtime and call with results in one week.  Appointment with Francisca for diabetes education.    RTC 4 mos    Send copy of my note to Dr. Cox

## 2019-02-01 PROBLEM — E10.9 TYPE 1 DIABETES MELLITUS WITHOUT COMPLICATION (HCC): Status: ACTIVE | Noted: 2018-02-22

## 2019-02-05 LAB
ALBUMIN SERPL-MCNC: 5 G/DL (ref 3.5–5.2)
ALBUMIN/GLOB SERPL: 2.3 G/DL
ALP SERPL-CCNC: 43 U/L (ref 39–117)
ALT SERPL-CCNC: 23 U/L (ref 1–41)
AST SERPL-CCNC: 15 U/L (ref 1–40)
BILIRUB SERPL-MCNC: 2.4 MG/DL (ref 0.1–1.2)
BUN SERPL-MCNC: 16 MG/DL (ref 6–20)
BUN/CREAT SERPL: 14.4 (ref 7–25)
C PEPTIDE SERPL-MCNC: 1.8 NG/ML (ref 1.1–4.4)
CALCIUM SERPL-MCNC: 10.3 MG/DL (ref 8.6–10.5)
CHLORIDE SERPL-SCNC: 101 MMOL/L (ref 98–107)
CHOLEST SERPL-MCNC: 207 MG/DL (ref 0–200)
CO2 SERPL-SCNC: 27.7 MMOL/L (ref 22–29)
CREAT SERPL-MCNC: 1.11 MG/DL (ref 0.76–1.27)
GAD65 AB SER IA-ACNC: 752 U/ML (ref 0–5)
GLOBULIN SER CALC-MCNC: 2.2 GM/DL
GLUCOSE SERPL-MCNC: 107 MG/DL (ref 65–99)
HBA1C MFR BLD: 8.5 % (ref 4.8–5.6)
HDLC SERPL-MCNC: 41 MG/DL (ref 40–60)
INTERPRETATION: NORMAL
LDLC SERPL CALC-MCNC: 121 MG/DL (ref 0–100)
Lab: NORMAL
POTASSIUM SERPL-SCNC: 4.3 MMOL/L (ref 3.5–5.2)
PROT SERPL-MCNC: 7.2 G/DL (ref 6–8.5)
SODIUM SERPL-SCNC: 142 MMOL/L (ref 136–145)
T4 FREE SERPL-MCNC: 1.54 NG/DL (ref 0.93–1.7)
TRIGL SERPL-MCNC: 225 MG/DL (ref 0–150)
TSH SERPL DL<=0.005 MIU/L-ACNC: 2.89 MIU/ML (ref 0.27–4.2)
VLDLC SERPL CALC-MCNC: 45 MG/DL (ref 5–40)

## 2019-03-26 ENCOUNTER — OFFICE VISIT (OUTPATIENT)
Dept: FAMILY MEDICINE CLINIC | Facility: CLINIC | Age: 39
End: 2019-03-26

## 2019-03-26 VITALS
TEMPERATURE: 97.7 F | SYSTOLIC BLOOD PRESSURE: 106 MMHG | RESPIRATION RATE: 16 BRPM | BODY MASS INDEX: 29.88 KG/M2 | WEIGHT: 232.8 LBS | DIASTOLIC BLOOD PRESSURE: 70 MMHG | OXYGEN SATURATION: 99 % | HEART RATE: 81 BPM | HEIGHT: 74 IN

## 2019-03-26 DIAGNOSIS — J03.90 TONSILLITIS: Primary | ICD-10-CM

## 2019-03-26 PROCEDURE — 99213 OFFICE O/P EST LOW 20 MIN: CPT | Performed by: FAMILY MEDICINE

## 2019-03-26 RX ORDER — AMOXICILLIN 500 MG/1
500 CAPSULE ORAL 3 TIMES DAILY
Qty: 30 CAPSULE | Refills: 0 | Status: SHIPPED | OUTPATIENT
Start: 2019-03-26 | End: 2019-05-17

## 2019-03-26 NOTE — PROGRESS NOTES
HPI  Erasmo Kelley is a 38 y.o. male who is here for a day history of congestion sore throat runny nose.  Possibly chills and low-grade fever.  Of note son was seen at pediatrician yesterday with no definitive diagnosis but apparently was placed on antibiotic therapy?      Review of Systems   Constitutional: Positive for chills.   HENT: Positive for congestion, hearing loss, postnasal drip, rhinorrhea, sinus pressure, sore throat and voice change. Negative for trouble swallowing.    Skin: Negative for rash.   All other systems reviewed and are negative.        History reviewed. No pertinent past medical history.    Past Surgical History:   Procedure Laterality Date   • APPENDECTOMY     • HERNIA REPAIR     • WISDOM TOOTH EXTRACTION         Family History   Problem Relation Age of Onset   • Thyroid disease Mother    • Diabetes Father    • Heart disease Father    • Hypertension Father    • Hyperlipidemia Father    • Hyperlipidemia Brother    • Heart disease Brother    • Diabetes Paternal Grandmother        Social History     Socioeconomic History   • Marital status:      Spouse name: Not on file   • Number of children: Not on file   • Years of education: Not on file   • Highest education level: Not on file   Occupational History   • Occupation: mechanical contractor   Tobacco Use   • Smoking status: Never Smoker   • Smokeless tobacco: Never Used   Substance and Sexual Activity   • Alcohol use: Yes     Comment: 2-30 beers/ week    • Drug use: No   • Sexual activity: No         Physical Exam   Constitutional: He is oriented to person, place, and time. He appears well-developed and well-nourished. No distress.   HENT:   Head: Normocephalic.   Right Ear: Tympanic membrane and ear canal normal.   Left Ear: Tympanic membrane and ear canal normal.   Nose: Nose normal.   Mouth/Throat: Uvula is midline and mucous membranes are normal. Posterior oropharyngeal erythema present. No tonsillar abscesses. Tonsils are 3+  on the right. Tonsils are 1+ on the left. Tonsillar exudate.   Eyes: Pupils are equal, round, and reactive to light.   Neck: Normal range of motion. Neck supple.   Cardiovascular: Normal rate and regular rhythm.   Pulmonary/Chest: Effort normal.   Musculoskeletal: Normal range of motion.   Lymphadenopathy:     He has no cervical adenopathy.   Neurological: He is alert and oriented to person, place, and time. Coordination normal.   Skin: Skin is warm and dry. No rash noted.   Psychiatric: He has a normal mood and affect. His behavior is normal. Judgment and thought content normal.   Nursing note and vitals reviewed.        Assessment/Plan    Erasmo was seen today for sore throat.    Diagnoses and all orders for this visit:    Tonsillitis  -     Beta Strep Culture, Throat - , Throat  -     amoxicillin (AMOXIL) 500 MG capsule; Take 1 capsule by mouth 3 (Three) Times a Day.      Patient presents with a purulent inflamed right tonsil.  Complains of sore throat and congestion.  Has been taking a lot of ibuprofen.  Will send off throat culture and start empiric antibiotic therapy.  Otherwise recommend warm salt water gargles and continue symptomatic therapy; call if symptoms worsen or persist.    This note includes information entered using a voice recognition dictation system.  Though reviewed, some nonsensible errors may remain.

## 2019-03-29 LAB — S PYO THROAT QL CULT: NEGATIVE

## 2019-04-16 RX ORDER — PEN NEEDLE, DIABETIC 30 GX3/16"
1 NEEDLE, DISPOSABLE MISCELLANEOUS 4 TIMES DAILY
Qty: 200 EACH | Refills: 3 | Status: SHIPPED | OUTPATIENT
Start: 2019-04-16 | End: 2019-04-22 | Stop reason: ALTCHOICE

## 2019-05-16 NOTE — PROGRESS NOTES
Subjective   Erasmo Kelley is a 39 y.o. male.     F/u for dm 1/ testing bs 2-4 x day dexcom clarity  / no recent eye exam / last dm foot exam 1/31/19 with dr Tomlinson           He was diagnosed to have diabetes mellitus in February 2018 with the 5-8 month history of increased thirst, polyuria, and weight loss of 35 pounds.  Random glucose done in February 2018 was elevated at 321 and hemoglobin A1c was 12.50%.  DOMINGO antibody was elevated at 689 units per mL.     He is on Tresiba 20-22 units every AM and Novolog 5-8 units with each meal.  He has seen the diabetes educator. He is using a Dexcom 6.     He has not had a recent eye exam.  He denies blurry vision.   He denies any numbness, tingling or burning sensation in his hands or feet.  Urine microalbumin was normal in March 2018.     He has no history of hypertension.   He has hyperlipidemia and is on diet alone.  His last meal was at 12  PM.     He does not smoke cigarettes.  He drinks 6-8 beers per week depending on social medications.  He denies alcohol-related disease.  He denies drug abuse.    The following portions of the patient's history were reviewed and updated as appropriate: allergies, current medications, past family history, past medical history, past social history, past surgical history and problem list.    Review of Systems   Constitutional: Negative.    HENT: Negative.    Eyes: Negative.    Respiratory: Negative.    Cardiovascular: Negative.    Gastrointestinal: Negative.    Endocrine: Negative.    Genitourinary: Negative.    Musculoskeletal: Negative.    Skin: Negative.    Allergic/Immunologic: Negative.    Neurological: Negative.    Hematological: Negative.    Psychiatric/Behavioral: Negative.        Objective   Physical Exam   Constitutional: He is oriented to person, place, and time. He appears well-developed and well-nourished. No distress.   HENT:   Head: Normocephalic.   Right Ear: External ear normal.   Left Ear: External ear normal.    Nose: Nose normal.   Mouth/Throat: No oropharyngeal exudate.   Eyes: Conjunctivae and EOM are normal. Right eye exhibits no discharge. Left eye exhibits no discharge. No scleral icterus.   Neck: Neck supple. No JVD present. No tracheal deviation present. No thyromegaly present.   Cardiovascular: Normal rate, regular rhythm, normal heart sounds and intact distal pulses. Exam reveals no gallop and no friction rub.   No murmur heard.  Pulmonary/Chest: Effort normal and breath sounds normal. No stridor. No respiratory distress. He has no wheezes. He has no rales. He exhibits no tenderness.   Abdominal: Soft. Bowel sounds are normal. He exhibits no distension and no mass. There is no tenderness. There is no rebound and no guarding.   Musculoskeletal: Normal range of motion. He exhibits no edema, tenderness or deformity.   Lymphadenopathy:     He has no cervical adenopathy.   Neurological: He is alert and oriented to person, place, and time. He displays normal reflexes. Coordination normal.   Intact light touch   Skin: Skin is warm and dry. No rash noted. No erythema.   Psychiatric: He has a normal mood and affect. His behavior is normal.     Office Visit on 03/26/2019   Component Date Value Ref Range Status   • Beta Strep Gp A Culture 03/26/2019 Negative   Final     Assessment/Plan   Erasmo was seen today for diabetes.    Diagnoses and all orders for this visit:    Type 1 diabetes mellitus without complication (CMS/[facility])  -     Comprehensive Metabolic Panel  -     Lipid Panel  -     Hemoglobin A1c  -     TSH  -     Microalbumin / Creatinine Urine Ratio - Urine, Clean Catch      Continue Tresiba and Novolog.  Discussed about insulin pump.    Copy of my note sent to Dr. Cox.    RTC 4 mos.

## 2019-05-17 ENCOUNTER — OFFICE VISIT (OUTPATIENT)
Dept: ENDOCRINOLOGY | Age: 39
End: 2019-05-17

## 2019-05-17 VITALS
BODY MASS INDEX: 29.9 KG/M2 | HEART RATE: 87 BPM | HEIGHT: 74 IN | WEIGHT: 233 LBS | SYSTOLIC BLOOD PRESSURE: 108 MMHG | OXYGEN SATURATION: 98 % | DIASTOLIC BLOOD PRESSURE: 66 MMHG

## 2019-05-17 DIAGNOSIS — E10.9 TYPE 1 DIABETES MELLITUS WITHOUT COMPLICATION (HCC): Primary | ICD-10-CM

## 2019-05-17 PROCEDURE — 99214 OFFICE O/P EST MOD 30 MIN: CPT | Performed by: INTERNAL MEDICINE

## 2019-05-18 LAB
ALBUMIN SERPL-MCNC: 4.6 G/DL (ref 3.5–5.2)
ALBUMIN/CREAT UR: 5.3 MG/G CREAT (ref 0–30)
ALBUMIN/GLOB SERPL: 1.9 G/DL
ALP SERPL-CCNC: 40 U/L (ref 39–117)
ALT SERPL-CCNC: 42 U/L (ref 1–41)
AST SERPL-CCNC: 118 U/L (ref 1–40)
BILIRUB SERPL-MCNC: 1.7 MG/DL (ref 0.2–1.2)
BUN SERPL-MCNC: 15 MG/DL (ref 6–20)
BUN/CREAT SERPL: 14.2 (ref 7–25)
CALCIUM SERPL-MCNC: 10.2 MG/DL (ref 8.6–10.5)
CHLORIDE SERPL-SCNC: 103 MMOL/L (ref 98–107)
CHOLEST SERPL-MCNC: 189 MG/DL (ref 0–200)
CO2 SERPL-SCNC: 25.9 MMOL/L (ref 22–29)
CREAT SERPL-MCNC: 1.06 MG/DL (ref 0.76–1.27)
CREAT UR-MCNC: 84.2 MG/DL
GLOBULIN SER CALC-MCNC: 2.4 GM/DL
GLUCOSE SERPL-MCNC: 83 MG/DL (ref 65–99)
HBA1C MFR BLD: 6.3 % (ref 4.8–5.6)
HDLC SERPL-MCNC: 50 MG/DL (ref 40–60)
INTERPRETATION: NORMAL
LDLC SERPL CALC-MCNC: 108 MG/DL (ref 0–100)
Lab: NORMAL
MICROALBUMIN UR-MCNC: 4.5 UG/ML
POTASSIUM SERPL-SCNC: 4 MMOL/L (ref 3.5–5.2)
PROT SERPL-MCNC: 7 G/DL (ref 6–8.5)
SODIUM SERPL-SCNC: 141 MMOL/L (ref 136–145)
TRIGL SERPL-MCNC: 154 MG/DL (ref 0–150)
TSH SERPL DL<=0.005 MIU/L-ACNC: 2.45 MIU/ML (ref 0.27–4.2)
VLDLC SERPL CALC-MCNC: 30.8 MG/DL

## 2019-06-10 ENCOUNTER — OFFICE VISIT (OUTPATIENT)
Dept: FAMILY MEDICINE CLINIC | Facility: CLINIC | Age: 39
End: 2019-06-10

## 2019-06-10 VITALS
DIASTOLIC BLOOD PRESSURE: 70 MMHG | OXYGEN SATURATION: 98 % | WEIGHT: 233 LBS | BODY MASS INDEX: 29.9 KG/M2 | HEART RATE: 76 BPM | TEMPERATURE: 97.8 F | HEIGHT: 74 IN | SYSTOLIC BLOOD PRESSURE: 120 MMHG

## 2019-06-10 DIAGNOSIS — E80.6 HYPERBILIRUBINEMIA: ICD-10-CM

## 2019-06-10 DIAGNOSIS — J30.1 SEASONAL ALLERGIC RHINITIS DUE TO POLLEN: ICD-10-CM

## 2019-06-10 DIAGNOSIS — R74.01 ELEVATED TRANSAMINASE LEVEL: Primary | ICD-10-CM

## 2019-06-10 PROCEDURE — 99214 OFFICE O/P EST MOD 30 MIN: CPT | Performed by: INTERNAL MEDICINE

## 2019-06-10 RX ORDER — FLUTICASONE PROPIONATE 50 MCG
2 SPRAY, SUSPENSION (ML) NASAL DAILY
Qty: 48 G | Refills: 0 | Status: SHIPPED | OUTPATIENT
Start: 2019-06-10 | End: 2020-03-26

## 2019-06-10 RX ORDER — FLUTICASONE PROPIONATE 50 MCG
2 SPRAY, SUSPENSION (ML) NASAL DAILY
Qty: 16 G | Refills: 1 | Status: SHIPPED | OUTPATIENT
Start: 2019-06-10 | End: 2019-06-10

## 2019-06-10 NOTE — PROGRESS NOTES
Subjective Chief complaint is follow-up for laboratories  Erasmo Kelley is a 39 y.o. male.     History of Present Illness   Erasmo is here today for follow-up.  He has insulin-dependent diabetes.  This is autoimmune in nature.  He is doing an endocrinologist.  His laboratories showed an elevated bilirubin as well as transaminases.  I do believe that we have evaluated his elevated bilirubin in the past.  The patient also seems to recall this.  I cannot however find the laboratories showing this.  He did have a CT scan of his abdomen proximally 1 year ago and his liver looked okay on that.  There was some mild splenomegaly.  His transaminases were little bit more elevated this time than usual.  The laboratories were drawn around derby day.  Patient does report that he had been drinking a little more alcohol at that time.  He feels well  His only new complaint is one of moderate bilateral maxillary sinus pressure and congestion.  This has been present for approximately 1 week.  There is no associated fever or chills.  The following portions of the patient's history were reviewed and updated as appropriate: allergies, current medications, past family history, past medical history, past social history, past surgical history and problem list.    Review of Systems   Respiratory: Negative for chest tightness and shortness of breath.    Cardiovascular: Negative for chest pain and leg swelling.   Gastrointestinal: Negative for abdominal pain and blood in stool.   Neurological: Negative for dizziness, light-headedness and headache.       Objective   Physical Exam   Constitutional: He appears well-developed and well-nourished.   HENT:   Tympanic membranes are normal.  There is bilateral nasal congestion mild erythema.  Oropharynx shows enlarged tonsils but no exudates.   Eyes: No scleral icterus.   Cardiovascular: Normal rate, regular rhythm and normal heart sounds.   Pulmonary/Chest: Effort normal and breath sounds  normal. No respiratory distress. He has no wheezes. He has no rales.   Abdominal: Soft. Bowel sounds are normal. He exhibits no distension and no mass. There is no tenderness. There is no rebound and no guarding.   Musculoskeletal: He exhibits no edema.   Nursing note and vitals reviewed.        Assessment/Plan   Erasmo was seen today for follow-up.    Diagnoses and all orders for this visit:    Elevated transaminase level  -     Hepatic Function Panel  -     Gamma GT  -     Bilirubin, Direct  -     CBC & Differential  -     Lactate Dehydrogenase    Hyperbilirubinemia  -     Hepatic Function Panel  -     Gamma GT  -     Bilirubin, Direct  -     CBC & Differential  -     Lactate Dehydrogenase    Seasonal allergic rhinitis due to pollen    Other orders  -     Chlorcyclizine-Pseudoephed 25-60 MG tablet; Take 1 tablet by mouth Every 12 (Twelve) Hours As Needed (congestion) for up to 10 days.  -     fluticasone (FLONASE) 50 MCG/ACT nasal spray; 2 sprays into the nostril(s) as directed by provider Daily.    Rohit is here today for evaluation of abnormal liver test.  He likely has some elevated transaminases from some drinking of beer around the time of derby.  I do believe that we have evaluated his elevated bilirubin in the past.  I suspect he has Gilbert's syndrome.  We will reevaluate this along with some other liver tests.  For his allergy and congestion I am going to prescribe some stay hist and fluticasone.

## 2019-06-11 LAB
ALBUMIN SERPL-MCNC: 4.6 G/DL (ref 3.5–5.2)
ALP SERPL-CCNC: 44 U/L (ref 39–117)
ALT SERPL-CCNC: 32 U/L (ref 1–41)
AST SERPL-CCNC: 21 U/L (ref 1–40)
BASOPHILS # BLD AUTO: 0.03 10*3/MM3 (ref 0–0.2)
BASOPHILS NFR BLD AUTO: 0.7 % (ref 0–1.5)
BILIRUB DIRECT SERPL-MCNC: 0.3 MG/DL (ref 0.2–0.3)
BILIRUB SERPL-MCNC: 1.9 MG/DL (ref 0.2–1.2)
EOSINOPHIL # BLD AUTO: 0.16 10*3/MM3 (ref 0–0.4)
EOSINOPHIL NFR BLD AUTO: 3.5 % (ref 0.3–6.2)
ERYTHROCYTE [DISTWIDTH] IN BLOOD BY AUTOMATED COUNT: 13.7 % (ref 12.3–15.4)
GGT SERPL-CCNC: 27 U/L (ref 8–61)
HCT VFR BLD AUTO: 45.4 % (ref 37.5–51)
HGB BLD-MCNC: 14.8 G/DL (ref 13–17.7)
IMM GRANULOCYTES # BLD AUTO: 0.01 10*3/MM3 (ref 0–0.05)
IMM GRANULOCYTES NFR BLD AUTO: 0.2 % (ref 0–0.5)
LDH SERPL-CCNC: 160 U/L (ref 135–225)
LYMPHOCYTES # BLD AUTO: 1.5 10*3/MM3 (ref 0.7–3.1)
LYMPHOCYTES NFR BLD AUTO: 32.9 % (ref 19.6–45.3)
MCH RBC QN AUTO: 30.2 PG (ref 26.6–33)
MCHC RBC AUTO-ENTMCNC: 32.6 G/DL (ref 31.5–35.7)
MCV RBC AUTO: 92.7 FL (ref 79–97)
MONOCYTES # BLD AUTO: 0.44 10*3/MM3 (ref 0.1–0.9)
MONOCYTES NFR BLD AUTO: 9.6 % (ref 5–12)
NEUTROPHILS # BLD AUTO: 2.42 10*3/MM3 (ref 1.7–7)
NEUTROPHILS NFR BLD AUTO: 53.1 % (ref 42.7–76)
NRBC BLD AUTO-RTO: 0 /100 WBC (ref 0–0.2)
PLATELET # BLD AUTO: 198 10*3/MM3 (ref 140–450)
PROT SERPL-MCNC: 6.8 G/DL (ref 6–8.5)
RBC # BLD AUTO: 4.9 10*6/MM3 (ref 4.14–5.8)
WBC # BLD AUTO: 4.56 10*3/MM3 (ref 3.4–10.8)

## 2019-06-12 PROBLEM — E80.4 GILBERT'S SYNDROME: Status: ACTIVE | Noted: 2019-06-12

## 2019-11-04 RX ORDER — INSULIN ASPART 100 [IU]/ML
INJECTION, SOLUTION INTRAVENOUS; SUBCUTANEOUS
Qty: 27 ML | Refills: 1 | Status: SHIPPED | OUTPATIENT
Start: 2019-11-04 | End: 2020-02-07

## 2020-03-17 DIAGNOSIS — E10.9 TYPE 1 DIABETES MELLITUS WITHOUT COMPLICATION (HCC): Primary | ICD-10-CM

## 2020-03-17 DIAGNOSIS — R79.9 ABNORMAL BLOOD CHEMISTRY: ICD-10-CM

## 2020-03-17 LAB
ALBUMIN SERPL-MCNC: 4.4 G/DL (ref 3.5–5.2)
ALBUMIN/GLOB SERPL: 1.9 G/DL
ALP SERPL-CCNC: 50 U/L (ref 39–117)
ALT SERPL-CCNC: 35 U/L (ref 1–41)
AST SERPL-CCNC: 22 U/L (ref 1–40)
BILIRUB SERPL-MCNC: 1.7 MG/DL (ref 0.2–1.2)
BUN SERPL-MCNC: 12 MG/DL (ref 6–20)
BUN/CREAT SERPL: 10.7 (ref 7–25)
CALCIUM SERPL-MCNC: 9.5 MG/DL (ref 8.6–10.5)
CHLORIDE SERPL-SCNC: 103 MMOL/L (ref 98–107)
CHOLEST SERPL-MCNC: 202 MG/DL (ref 0–200)
CO2 SERPL-SCNC: 27.6 MMOL/L (ref 22–29)
CREAT SERPL-MCNC: 1.12 MG/DL (ref 0.76–1.27)
GLOBULIN SER CALC-MCNC: 2.3 GM/DL
GLUCOSE SERPL-MCNC: 148 MG/DL (ref 65–99)
HBA1C MFR BLD: 7 % (ref 4.8–5.6)
HDLC SERPL-MCNC: 49 MG/DL (ref 40–60)
INTERPRETATION: NORMAL
LDLC SERPL CALC-MCNC: 125 MG/DL (ref 0–100)
Lab: NORMAL
POTASSIUM SERPL-SCNC: 4.5 MMOL/L (ref 3.5–5.2)
PROT SERPL-MCNC: 6.7 G/DL (ref 6–8.5)
SODIUM SERPL-SCNC: 141 MMOL/L (ref 136–145)
TRIGL SERPL-MCNC: 138 MG/DL (ref 0–150)
TSH SERPL DL<=0.005 MIU/L-ACNC: 3.56 UIU/ML (ref 0.27–4.2)
VLDLC SERPL CALC-MCNC: 27.6 MG/DL

## 2020-03-26 ENCOUNTER — OFFICE VISIT (OUTPATIENT)
Dept: ENDOCRINOLOGY | Age: 40
End: 2020-03-26

## 2020-03-26 VITALS
BODY MASS INDEX: 29.72 KG/M2 | HEART RATE: 87 BPM | WEIGHT: 239 LBS | HEIGHT: 75 IN | DIASTOLIC BLOOD PRESSURE: 76 MMHG | SYSTOLIC BLOOD PRESSURE: 120 MMHG | OXYGEN SATURATION: 98 %

## 2020-03-26 DIAGNOSIS — E78.49 OTHER HYPERLIPIDEMIA: ICD-10-CM

## 2020-03-26 DIAGNOSIS — E10.9 TYPE 1 DIABETES MELLITUS WITHOUT COMPLICATION (HCC): Primary | ICD-10-CM

## 2020-03-26 PROCEDURE — 99214 OFFICE O/P EST MOD 30 MIN: CPT | Performed by: INTERNAL MEDICINE

## 2020-04-08 RX ORDER — INSULIN DEGLUDEC INJECTION 100 U/ML
INJECTION, SOLUTION SUBCUTANEOUS
Qty: 15 ML | Refills: 3 | Status: SHIPPED | OUTPATIENT
Start: 2020-04-08 | End: 2020-10-09 | Stop reason: SDUPTHER

## 2020-06-05 ENCOUNTER — TELEPHONE (OUTPATIENT)
Dept: ENDOCRINOLOGY | Age: 40
End: 2020-06-05

## 2020-06-05 NOTE — TELEPHONE ENCOUNTER
New pharmacy    [facility]     dexcom sensors     He states he needs all of his prescriptions for a 90 day supply

## 2020-06-08 RX ORDER — PROCHLORPERAZINE 25 MG/1
SUPPOSITORY RECTAL
Qty: 9 EACH | Refills: 1 | Status: SHIPPED | OUTPATIENT
Start: 2020-06-08 | End: 2021-01-04

## 2020-06-08 RX ORDER — PROCHLORPERAZINE 25 MG/1
1 SUPPOSITORY RECTAL
Qty: 1 EACH | Refills: 1 | Status: SHIPPED | OUTPATIENT
Start: 2020-06-08 | End: 2021-01-11

## 2020-06-08 RX ORDER — PROCHLORPERAZINE 25 MG/1
1 SUPPOSITORY RECTAL AS NEEDED
Qty: 1 DEVICE | Refills: 0 | Status: SHIPPED | OUTPATIENT
Start: 2020-06-08

## 2020-10-09 ENCOUNTER — TELEPHONE (OUTPATIENT)
Dept: ENDOCRINOLOGY | Age: 40
End: 2020-10-09

## 2020-10-13 RX ORDER — INSULIN ASPART 100 [IU]/ML
INJECTION, SOLUTION INTRAVENOUS; SUBCUTANEOUS
Qty: 19 ML | Refills: 1 | OUTPATIENT
Start: 2020-10-13

## 2020-10-13 RX ORDER — INSULIN DEGLUDEC INJECTION 100 U/ML
20 INJECTION, SOLUTION SUBCUTANEOUS DAILY
Qty: 20 ML | Refills: 1 | OUTPATIENT
Start: 2020-10-13

## 2020-10-13 RX ORDER — INSULIN DEGLUDEC INJECTION 100 U/ML
20 INJECTION, SOLUTION SUBCUTANEOUS DAILY
Qty: 10 PEN | Refills: 1 | Status: SHIPPED | OUTPATIENT
Start: 2020-10-13 | End: 2021-06-30 | Stop reason: SDUPTHER

## 2020-10-13 RX ORDER — INSULIN ASPART 100 [IU]/ML
INJECTION, SOLUTION INTRAVENOUS; SUBCUTANEOUS
Qty: 10 PEN | Refills: 1 | Status: SHIPPED | OUTPATIENT
Start: 2020-10-13 | End: 2021-01-05 | Stop reason: ALTCHOICE

## 2021-01-04 RX ORDER — PROCHLORPERAZINE 25 MG/1
SUPPOSITORY RECTAL
Qty: 9 EACH | Refills: 2 | Status: SHIPPED | OUTPATIENT
Start: 2021-01-04 | End: 2021-10-01

## 2021-01-05 RX ORDER — INSULIN LISPRO 100 [IU]/ML
INJECTION, SOLUTION INTRAVENOUS; SUBCUTANEOUS
Qty: 27 ML | Refills: 0 | Status: SHIPPED | OUTPATIENT
Start: 2021-01-05 | End: 2021-06-21

## 2021-01-11 RX ORDER — PROCHLORPERAZINE 25 MG/1
SUPPOSITORY RECTAL
Qty: 1 EACH | Refills: 0 | Status: SHIPPED | OUTPATIENT
Start: 2021-01-11 | End: 2021-05-06

## 2021-02-05 NOTE — PROGRESS NOTES
Subjective   Erasmo Kelley is a 40 y.o. male.     F/u for dm 1/ testing bs using the dexcom 6 / last dm eye exam / telemed      Patient is a 40 year old who consented to a phone visit.  Total time 18 minutes     He was diagnosed to have diabetes mellitus in February 2018 with the 5-8 month history of increased thirst, polyuria, and weight loss of 35 pounds.  Random glucose done in February 2018 was elevated at 321 and hemoglobin A1c was 12.50%.  DOMINGO antibody was elevated at 689 units per mL.     He is on Tresiba 22 units every AM and NovoLog 6-10 units with each meal.  He has seen the diabetic educator.  He eats out daily at lunch.  He is using a Dexcom sensor.  He has no severe hypoglycemic episode.    Sensor data reviewed  Average glucose 166 mg per DL.  Time in the range 59%.  Time above range 39%  Time below range 1%.    He has no early morning hypoglycemia.  Fasting glucose is around 150.  He has postprandial hyperglycemia mostly after lunch.  He tends to eat out at lunchtime.     He has not had an eye exam.   He denies any numbness, tingling or burning sensation in his hands or feet.  Urine microalbumin was normal in 5/19.     He has no history of hypertension.   He has hyperlipidemia and is on diet alone.       He does not smoke cigarettes.  He drinks 6-8 beers per week depending on social occasion.  He denies alcohol-related disease.  He denies drug abuse.    He had first of 2 Pfizer Covid vaccine  The following portions of the patient's history were reviewed and updated as appropriate: allergies, current medications, past family history, past medical history, past social history, past surgical history and problem list.    Review of Systems   Respiratory: Negative for shortness of breath.    Cardiovascular: Negative.  Negative for chest pain and palpitations.   Gastrointestinal: Negative.    Genitourinary: Negative.    Musculoskeletal: Negative for myalgias.   Neurological: Negative for weakness and  numbness.     Objective      There were no vitals filed for this visit.      Physical Exam  Orders Only on 03/17/2020   Component Date Value Ref Range Status   • Glucose 03/17/2020 148* 65 - 99 mg/dL Final   • BUN 03/17/2020 12  6 - 20 mg/dL Final   • Creatinine 03/17/2020 1.12  0.76 - 1.27 mg/dL Final   • eGFR Non African Am 03/17/2020 74  >60 mL/min/1.73 Final   • eGFR African Am 03/17/2020 89  >60 mL/min/1.73 Final   • BUN/Creatinine Ratio 03/17/2020 10.7  7.0 - 25.0 Final   • Sodium 03/17/2020 141  136 - 145 mmol/L Final   • Potassium 03/17/2020 4.5  3.5 - 5.2 mmol/L Final   • Chloride 03/17/2020 103  98 - 107 mmol/L Final   • Total CO2 03/17/2020 27.6  22.0 - 29.0 mmol/L Final   • Calcium 03/17/2020 9.5  8.6 - 10.5 mg/dL Final   • Total Protein 03/17/2020 6.7  6.0 - 8.5 g/dL Final   • Albumin 03/17/2020 4.40  3.50 - 5.20 g/dL Final   • Globulin 03/17/2020 2.3  gm/dL Final   • A/G Ratio 03/17/2020 1.9  g/dL Final   • Total Bilirubin 03/17/2020 1.7* 0.2 - 1.2 mg/dL Final   • Alkaline Phosphatase 03/17/2020 50  39 - 117 U/L Final   • AST (SGOT) 03/17/2020 22  1 - 40 U/L Final   • ALT (SGPT) 03/17/2020 35  1 - 41 U/L Final   • TSH 03/17/2020 3.560  0.270 - 4.200 uIU/mL Final   • Total Cholesterol 03/17/2020 202* 0 - 200 mg/dL Final   • Triglycerides 03/17/2020 138  0 - 150 mg/dL Final   • HDL Cholesterol 03/17/2020 49  40 - 60 mg/dL Final   • VLDL Cholesterol 03/17/2020 27.6  mg/dL Final   • LDL Cholesterol  03/17/2020 125* 0 - 100 mg/dL Final   • Hemoglobin A1C 03/17/2020 7.00* 4.80 - 5.60 % Final    Comment: Hemoglobin A1C Ranges:  Increased Risk for Diabetes  5.7% to 6.4%  Diabetes                     >= 6.5%  Diabetic Goal                < 7.0%     • Interpretation 03/17/2020 Note   Final    Supplemental report is available.   • PDF Image 03/17/2020 Not applicable   Final     Assessment/Plan   Diagnoses and all orders for this visit:    1. Type 1 diabetes mellitus without complication (CMS/HCC) (Primary)  -      Comprehensive Metabolic Panel; Future  -     Hemoglobin A1c; Future  -     TSH; Future  -     T4, Free; Future  -     Microalbumin / Creatinine Urine Ratio - Urine, Clean Catch; Future    2. Autoimmune diabetes (CMS/HCC)  -     Comprehensive Metabolic Panel; Future  -     Hemoglobin A1c; Future  -     TSH; Future  -     T4, Free; Future  -     Microalbumin / Creatinine Urine Ratio - Urine, Clean Catch; Future    3. Other hyperlipidemia  -     Comprehensive Metabolic Panel; Future  -     Lipid Panel; Future  -     TSH; Future  -     T4, Free; Future      Continue Tresiba and NovoLog.  Discussed with patient about how to make adjustments with NovoLog dose at mealtime.  Continue no concentrated sweet low-fat diet.  Advised to have a yearly eye examination.    Copy of my note sent to Dr. Cox    Follow-up in 4 months

## 2021-02-12 ENCOUNTER — OFFICE VISIT (OUTPATIENT)
Dept: ENDOCRINOLOGY | Age: 41
End: 2021-02-12

## 2021-02-12 DIAGNOSIS — E78.49 OTHER HYPERLIPIDEMIA: ICD-10-CM

## 2021-02-12 DIAGNOSIS — E10.9 AUTOIMMUNE DIABETES (HCC): ICD-10-CM

## 2021-02-12 DIAGNOSIS — E10.9 TYPE 1 DIABETES MELLITUS WITHOUT COMPLICATION (HCC): Primary | ICD-10-CM

## 2021-02-12 PROCEDURE — 99442 PR PHYS/QHP TELEPHONE EVALUATION 11-20 MIN: CPT | Performed by: INTERNAL MEDICINE

## 2021-05-06 RX ORDER — PROCHLORPERAZINE 25 MG/1
SUPPOSITORY RECTAL
Qty: 1 EACH | Refills: 3 | Status: SHIPPED | OUTPATIENT
Start: 2021-05-06 | End: 2022-06-07 | Stop reason: SDUPTHER

## 2021-06-22 RX ORDER — INSULIN LISPRO 100 [IU]/ML
INJECTION, SOLUTION INTRAVENOUS; SUBCUTANEOUS
Qty: 30 ML | Refills: 1 | Status: SHIPPED | OUTPATIENT
Start: 2021-06-22 | End: 2021-06-30 | Stop reason: SDUPTHER

## 2021-06-29 ENCOUNTER — TELEPHONE (OUTPATIENT)
Dept: FAMILY MEDICINE CLINIC | Facility: CLINIC | Age: 41
End: 2021-06-29

## 2021-06-29 NOTE — TELEPHONE ENCOUNTER
Caller: Erasmo Kelley    Relationship: Self    Best call back number: [phone]    Medication needed:   NOVALOG 100 UNITS    When do you need the refill by: ASAP     What additional details did the patient provide when requesting the medication: PATIENT HAS A LITTLE LESS THAN A WEEK. HIS ENDOCRINOLOGIST'S OFFICE SHUT DOWN AND HE HAS NOT BEEN ABLE TO GET MEDICATION FOR 2 WEEKS. HE WANTED TO KNOW IF DR. VIRAMONTES WOULD BE WILLING TO TAKE OVER PRESCRIBING THE MEDICATION UNTIL HE CAN FIND ANOTHER ENDOCRINOLOGIST.THE PATIENT WOULD LIKE TO KNOW WHO THE DOCTOR WOULD BEST RECOMMEND . PLEASE CALL THE PATIENT AND ADVISE.  Does the patient have less than a 3 day supply:  [x] Yes  [] No    What is the patient's preferred pharmacy: [facility] - [address] [phone] [facility] [phone]

## 2021-06-30 RX ORDER — INSULIN DEGLUDEC INJECTION 100 U/ML
22 INJECTION, SOLUTION SUBCUTANEOUS DAILY
Qty: 7 PEN | Refills: 3 | Status: SHIPPED | OUTPATIENT
Start: 2021-06-30 | End: 2021-11-29

## 2021-06-30 RX ORDER — INSULIN DEGLUDEC INJECTION 100 U/ML
22 INJECTION, SOLUTION SUBCUTANEOUS DAILY
Qty: 7 PEN | Refills: 3 | Status: SHIPPED | OUTPATIENT
Start: 2021-06-30 | End: 2021-06-30 | Stop reason: SDUPTHER

## 2021-06-30 RX ORDER — INSULIN LISPRO 100 [IU]/ML
INJECTION, SOLUTION INTRAVENOUS; SUBCUTANEOUS
Qty: 30 ML | Refills: 1 | Status: SHIPPED | OUTPATIENT
Start: 2021-06-30 | End: 2021-06-30 | Stop reason: SDUPTHER

## 2021-06-30 RX ORDER — INSULIN LISPRO 100 [IU]/ML
INJECTION, SOLUTION INTRAVENOUS; SUBCUTANEOUS
Qty: 30 ML | Refills: 1 | Status: SHIPPED | OUTPATIENT
Start: 2021-06-30 | End: 2021-11-29 | Stop reason: SDUPTHER

## 2021-10-01 RX ORDER — PROCHLORPERAZINE 25 MG/1
SUPPOSITORY RECTAL
Qty: 9 EACH | Refills: 0 | Status: SHIPPED | OUTPATIENT
Start: 2021-10-01 | End: 2021-12-27

## 2021-11-29 ENCOUNTER — OFFICE VISIT (OUTPATIENT)
Dept: ENDOCRINOLOGY | Age: 41
End: 2021-11-29

## 2021-11-29 VITALS
OXYGEN SATURATION: 99 % | BODY MASS INDEX: 32.52 KG/M2 | SYSTOLIC BLOOD PRESSURE: 132 MMHG | WEIGHT: 253.4 LBS | HEIGHT: 74 IN | DIASTOLIC BLOOD PRESSURE: 82 MMHG | HEART RATE: 79 BPM

## 2021-11-29 DIAGNOSIS — E80.4 GILBERT'S SYNDROME: ICD-10-CM

## 2021-11-29 DIAGNOSIS — E78.49 OTHER HYPERLIPIDEMIA: ICD-10-CM

## 2021-11-29 DIAGNOSIS — E10.9 TYPE 1 DIABETES MELLITUS WITHOUT COMPLICATION (HCC): Primary | ICD-10-CM

## 2021-11-29 PROCEDURE — 99214 OFFICE O/P EST MOD 30 MIN: CPT | Performed by: INTERNAL MEDICINE

## 2021-11-29 RX ORDER — INSULIN DEGLUDEC INJECTION 100 U/ML
24 INJECTION, SOLUTION SUBCUTANEOUS DAILY
Qty: 7 PEN | Refills: 3 | Status: SHIPPED | OUTPATIENT
Start: 2021-11-29 | End: 2022-06-28 | Stop reason: SDUPTHER

## 2021-11-29 RX ORDER — INSULIN LISPRO 100 [IU]/ML
INJECTION, SOLUTION INTRAVENOUS; SUBCUTANEOUS
Qty: 75 ML | Refills: 1 | Status: SHIPPED | OUTPATIENT
Start: 2021-11-29 | End: 2022-03-14

## 2021-11-29 NOTE — PROGRESS NOTES
Subjective   Erasmo Kelley is a 41 y.o. male.     F/u for dm 1, hyperlipidemia / testing bs using the dexcom 6 / last dm eye exam 3/18/21 with dr Norris  / last foot exam today with dr Tomlinson      Patient is a 41 year old who came in for follow-up.     He was diagnosed to have diabetes mellitus in February 2018 with the 5-8 month history of increased thirst, polyuria, and weight loss of 35 pounds.  Random glucose done in February 2018 was elevated at 321 and hemoglobin A1c was 12.50%.  DOMINGO antibody was elevated at 689 units per mL.  He has gained 14 pounds since March 2020.  He does not exercise regularly.     He is on Tresiba 22 units every AM and NovoLog 15 units with each meal.  He has seen the diabetic educator.  He is using a Dexcom 6 sensor.  He has few hypoglycemic episode.  His last meal was last night.     Sensor data reviewed.  Average glucose 173 mg per DL.  Time in range 51%.  Time above range 48%.  He has no early morning hypoglycemia.  He has postprandial hyperglycemia mostly after supper.     His last eye examination was in March or April 2021.  He has no retinopathy..   He denies any numbness, tingling or burning sensation in his hands or feet.  Urine microalbumin was normal in 5/19.     He has no history of hypertension.   He has hyperlipidemia and is on diet alone.       He does not smoke cigarettes.  He drinks 6-8 beers per week depending on social occasion.  He denies alcohol-related disease.  He denies drug abuse.    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  The following portions of the patient's history were reviewed and updated as appropriate: allergies, current medications, past family history, past medical history, past social history, past surgical history and problem list.    Review of Systems   HENT: Negative.    Respiratory: Negative for shortness of breath.    Cardiovascular: Negative.  Negative for chest pain and palpitations.   Gastrointestinal: Negative.    Endocrine: Negative.   "  Genitourinary: Negative.    Musculoskeletal: Negative for myalgias.   Neurological: Negative for numbness.     Objective      Vitals:    11/29/21 0859   BP: 132/82   BP Location: Right arm   Patient Position: Sitting   Cuff Size: Large Adult   Pulse: 79   SpO2: 99%   Weight: 115 kg (253 lb 6.4 oz)   Height: 188 cm (74\")     Physical Exam  Constitutional:       Appearance: Normal appearance. He is not toxic-appearing or diaphoretic.   Eyes:      General: No scleral icterus.        Right eye: No discharge.         Left eye: No discharge.      Extraocular Movements: Extraocular movements intact.      Conjunctiva/sclera: Conjunctivae normal.   Neck:      Vascular: No carotid bruit.   Cardiovascular:      Rate and Rhythm: Normal rate and regular rhythm.      Heart sounds: Normal heart sounds. No murmur heard.  No friction rub.   Pulmonary:      Effort: No respiratory distress.      Breath sounds: Normal breath sounds. No stridor. No rales.   Chest:      Chest wall: No tenderness.   Abdominal:      General: Bowel sounds are normal. There is no distension.      Palpations: Abdomen is soft. There is no mass.      Tenderness: There is no right CVA tenderness or left CVA tenderness.   Musculoskeletal:         General: No swelling or tenderness. Normal range of motion.      Cervical back: Normal range of motion and neck supple.      Right lower leg: No edema.      Left lower leg: No edema.   Lymphadenopathy:      Cervical: No cervical adenopathy.   Skin:     General: Skin is warm.      Coloration: Skin is not jaundiced or pale.   Neurological:      General: No focal deficit present.      Mental Status: He is alert and oriented to person, place, and time.   Psychiatric:         Mood and Affect: Mood normal.         Behavior: Behavior normal.       Orders Only on 03/17/2020   Component Date Value Ref Range Status   • Glucose 03/17/2020 148* 65 - 99 mg/dL Final   • BUN 03/17/2020 12  6 - 20 mg/dL Final   • Creatinine 03/17/2020 " 1.12  0.76 - 1.27 mg/dL Final   • eGFR Non African Am 03/17/2020 74  >60 mL/min/1.73 Final   • eGFR African Am 03/17/2020 89  >60 mL/min/1.73 Final   • BUN/Creatinine Ratio 03/17/2020 10.7  7.0 - 25.0 Final   • Sodium 03/17/2020 141  136 - 145 mmol/L Final   • Potassium 03/17/2020 4.5  3.5 - 5.2 mmol/L Final   • Chloride 03/17/2020 103  98 - 107 mmol/L Final   • Total CO2 03/17/2020 27.6  22.0 - 29.0 mmol/L Final   • Calcium 03/17/2020 9.5  8.6 - 10.5 mg/dL Final   • Total Protein 03/17/2020 6.7  6.0 - 8.5 g/dL Final   • Albumin 03/17/2020 4.40  3.50 - 5.20 g/dL Final   • Globulin 03/17/2020 2.3  gm/dL Final   • A/G Ratio 03/17/2020 1.9  g/dL Final   • Total Bilirubin 03/17/2020 1.7* 0.2 - 1.2 mg/dL Final   • Alkaline Phosphatase 03/17/2020 50  39 - 117 U/L Final   • AST (SGOT) 03/17/2020 22  1 - 40 U/L Final   • ALT (SGPT) 03/17/2020 35  1 - 41 U/L Final   • TSH 03/17/2020 3.560  0.270 - 4.200 uIU/mL Final   • Total Cholesterol 03/17/2020 202* 0 - 200 mg/dL Final   • Triglycerides 03/17/2020 138  0 - 150 mg/dL Final   • HDL Cholesterol 03/17/2020 49  40 - 60 mg/dL Final   • VLDL Cholesterol 03/17/2020 27.6  mg/dL Final   • LDL Cholesterol  03/17/2020 125* 0 - 100 mg/dL Final   • Hemoglobin A1C 03/17/2020 7.00* 4.80 - 5.60 % Final    Comment: Hemoglobin A1C Ranges:  Increased Risk for Diabetes  5.7% to 6.4%  Diabetes                     >= 6.5%  Diabetic Goal                < 7.0%     • Interpretation 03/17/2020 Note   Final    Supplemental report is available.   • PDF Image 03/17/2020 Not applicable   Final     Assessment/Plan   Diagnoses and all orders for this visit:    1. Type 1 diabetes mellitus without complication (HCC) (Primary)  -     Comprehensive Metabolic Panel  -     Lipid Panel  -     Hemoglobin A1c  -     TSH  -     T4, Free  -     Microalbumin / Creatinine Urine Ratio - Urine, Clean Catch    2. Other hyperlipidemia  -     Lipid Panel  -     Microalbumin / Creatinine Urine Ratio - Urine, Clean  Catch    3. Gilbert's syndrome  -     Comprehensive Metabolic Panel    Other orders  -     Insulin Lispro, 1 Unit Dial, (HUMALOG) 100 UNIT/ML solution pen-injector; INJECT 15UNITS UNDER THE SKIN 3-4 TIMES A DAY  Dispense: 75 mL; Refill: 1  -     insulin degludec (Tresiba FlexTouch) 100 UNIT/ML solution pen-injector injection; Inject 24 Units under the skin into the appropriate area as directed Daily.  Dispense: 7 pen; Refill: 3    Increase Tresiba to 24 units every PM  Continue mealtime Humalog.  Appointment with Francisca Gray with regards to carbohydrate counting possible insulin pump.    Discussed about insulin pumps.  Printed information on insulin pump was given to the patient.  Continue no concentrated sweet low-fat diet.    Copy of my note sent to Dr. Cox.    RTC 4 mos

## 2021-11-30 ENCOUNTER — IMMUNIZATION (OUTPATIENT)
Dept: VACCINE CLINIC | Facility: HOSPITAL | Age: 41
End: 2021-11-30

## 2021-11-30 LAB
ALBUMIN SERPL-MCNC: 4.8 G/DL (ref 4–5)
ALBUMIN/CREAT UR: 4 MG/G CREAT (ref 0–29)
ALBUMIN/GLOB SERPL: 1.9 {RATIO} (ref 1.2–2.2)
ALP SERPL-CCNC: 55 IU/L (ref 44–121)
ALT SERPL-CCNC: 43 IU/L (ref 0–44)
AST SERPL-CCNC: 25 IU/L (ref 0–40)
BILIRUB SERPL-MCNC: 1.2 MG/DL (ref 0–1.2)
BUN SERPL-MCNC: 15 MG/DL (ref 6–20)
BUN/CREAT SERPL: 16 (ref 9–20)
CALCIUM SERPL-MCNC: 9.5 MG/DL (ref 8.7–10.2)
CHLORIDE SERPL-SCNC: 102 MMOL/L (ref 96–106)
CHOLEST SERPL-MCNC: 247 MG/DL (ref 100–199)
CO2 SERPL-SCNC: 23 MMOL/L (ref 20–29)
CREAT SERPL-MCNC: 0.95 MG/DL (ref 0.76–1.27)
CREAT UR-MCNC: 209 MG/DL
GLOBULIN SER CALC-MCNC: 2.5 G/DL (ref 1.5–4.5)
GLUCOSE SERPL-MCNC: 197 MG/DL (ref 65–99)
HBA1C MFR BLD: 7.3 % (ref 4.8–5.6)
HDLC SERPL-MCNC: 44 MG/DL
IMP & REVIEW OF LAB RESULTS: NORMAL
LDLC SERPL CALC-MCNC: 175 MG/DL (ref 0–99)
MICROALBUMIN UR-MCNC: 9.3 UG/ML
POTASSIUM SERPL-SCNC: 4.5 MMOL/L (ref 3.5–5.2)
PROT SERPL-MCNC: 7.3 G/DL (ref 6–8.5)
SODIUM SERPL-SCNC: 140 MMOL/L (ref 134–144)
T4 FREE SERPL-MCNC: 1.05 NG/DL (ref 0.82–1.77)
TRIGL SERPL-MCNC: 152 MG/DL (ref 0–149)
TSH SERPL DL<=0.005 MIU/L-ACNC: 2.63 UIU/ML (ref 0.45–4.5)
VLDLC SERPL CALC-MCNC: 28 MG/DL (ref 5–40)

## 2021-11-30 PROCEDURE — 91300 HC SARSCOV02 VAC 30MCG/0.3ML IM: CPT | Performed by: INTERNAL MEDICINE

## 2021-11-30 PROCEDURE — 0004A HC ADM SARSCOV2 30MCG/0.3ML BOOSTER: CPT | Performed by: INTERNAL MEDICINE

## 2021-12-02 DIAGNOSIS — E10.9 TYPE 1 DIABETES MELLITUS WITHOUT COMPLICATION (HCC): Primary | ICD-10-CM

## 2021-12-04 RX ORDER — ATORVASTATIN CALCIUM 10 MG/1
10 TABLET, FILM COATED ORAL DAILY
Qty: 30 TABLET | Refills: 5 | Status: SHIPPED | OUTPATIENT
Start: 2021-12-04 | End: 2022-11-20

## 2021-12-27 RX ORDER — PROCHLORPERAZINE 25 MG/1
SUPPOSITORY RECTAL
Qty: 9 EACH | Refills: 1 | Status: SHIPPED | OUTPATIENT
Start: 2021-12-27 | End: 2022-06-07

## 2022-03-14 RX ORDER — INSULIN LISPRO 100 [IU]/ML
INJECTION, SOLUTION INTRAVENOUS; SUBCUTANEOUS
Qty: 45 ML | Refills: 1 | Status: SHIPPED | OUTPATIENT
Start: 2022-03-14 | End: 2022-08-30 | Stop reason: SDUPTHER

## 2022-05-10 NOTE — PROGRESS NOTES
Subjective   Erasmo Kelley is a 42 y.o. male.      F/u for dm 1, hyperlipidemia / testing bs using the dexcom 6 / last dm eye exam 3/18/21 with dr Norris  / last foot exam today with dr Tomlinson            Patient is a 42 year old who came in for follow-up.     He was diagnosed to have diabetes mellitus in February 2018 with the 5-8 month history of increased thirst, polyuria, and weight loss of 35 pounds.  Random glucose done in February 2018 was elevated at 321 and hemoglobin A1c was 12.50%.  DOMINGO antibody was elevated at 689 units per mL.  He has lost 5 lbs since 11/21.  He does not exercise regularly.     He is on Tresiba 24 units every AM and Insulin lispro TID with meals.  He occasionally has delayed meal time boluses.  He has seen the diabetic educator.  He is using a Dexcom 6 sensor.  He has few hypoglycemic episode.  His last meal last meal.     Sensor data reviewed.  Average glucose 175 mg per DL.  Time in range 49%.  Time above range 50%.  Time below range less than 1%.    His last eye examination was in March 2021.  He has no retinopathy..   He denies any numbness, tingling or burning sensation in his hands or feet.  Urine microalbumin was normal in 11/21.     He has no history of hypertension.   He has hyperlipidemia and is on diet alone.       He does not smoke cigarettes.  He drinks 6-8 beers per week depending on social occasion.  He denies alcohol-related disease.  He denies drug abuse.  He walks a lot at home and at work.     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      The following portions of the patient's history were reviewed and updated as appropriate: allergies, current medications, past family history, past medical history, past social history, past surgical history and problem list.    Review of Systems   HENT: Negative.    Eyes: Negative for visual disturbance.   Respiratory: Negative.  Negative for shortness of breath.    Cardiovascular: Negative.  Negative for chest pain and palpitations.  "  Gastrointestinal: Negative.    Endocrine: Negative for cold intolerance and heat intolerance.   Genitourinary: Negative.    Musculoskeletal: Negative for myalgias.   Neurological: Negative for weakness and numbness.   Hematological: Negative for adenopathy. Does not bruise/bleed easily.     Objective      Vitals:    05/11/22 0834   BP: 125/72   Pulse: 82   SpO2: 98%   Weight: 113 kg (248 lb 12.8 oz)   Height: 188 cm (74.02\")     Physical Exam  Constitutional:       General: He is not in acute distress.     Appearance: Normal appearance. He is not ill-appearing, toxic-appearing or diaphoretic.   Eyes:      General: No scleral icterus.        Right eye: No discharge.         Left eye: No discharge.      Extraocular Movements: Extraocular movements intact.      Conjunctiva/sclera: Conjunctivae normal.   Neck:      Vascular: No carotid bruit.   Cardiovascular:      Rate and Rhythm: Normal rate and regular rhythm.      Pulses: Normal pulses.      Heart sounds: Normal heart sounds. No murmur heard.    No friction rub. No gallop.   Pulmonary:      Effort: No respiratory distress.      Breath sounds: Normal breath sounds. No stridor. No rales.   Chest:      Chest wall: No tenderness.   Abdominal:      General: Bowel sounds are normal. There is no distension.      Palpations: Abdomen is soft. There is no mass.      Tenderness: There is no right CVA tenderness or left CVA tenderness.   Musculoskeletal:         General: No swelling or tenderness. Normal range of motion.      Cervical back: Normal range of motion and neck supple.      Right lower leg: No edema.      Left lower leg: No edema.   Lymphadenopathy:      Cervical: No cervical adenopathy.   Skin:     General: Skin is warm.   Neurological:      Mental Status: He is alert and oriented to person, place, and time.   Psychiatric:         Mood and Affect: Mood normal.         Behavior: Behavior normal.       Office Visit on 11/29/2021   Component Date Value Ref Range " Status   • Glucose 11/29/2021 197 (A) 65 - 99 mg/dL Final   • BUN 11/29/2021 15  6 - 20 mg/dL Final   • Creatinine 11/29/2021 0.95  0.76 - 1.27 mg/dL Final   • eGFR Non African Am 11/29/2021 100  >59 mL/min/1.73 Final   • eGFR African Am 11/29/2021 116  >59 mL/min/1.73 Final    Comment: **In accordance with recommendations from the NKF-ASN Task force,**    [facility] is in the process of updating its eGFR calculation to the    2021 CKD-EPI creatinine equation that estimates kidney function    without a race variable.     • BUN/Creatinine Ratio 11/29/2021 16  9 - 20 Final   • Sodium 11/29/2021 140  134 - 144 mmol/L Final   • Potassium 11/29/2021 4.5  3.5 - 5.2 mmol/L Final   • Chloride 11/29/2021 102  96 - 106 mmol/L Final   • Total CO2 11/29/2021 23  20 - 29 mmol/L Final   • Calcium 11/29/2021 9.5  8.7 - 10.2 mg/dL Final   • Total Protein 11/29/2021 7.3  6.0 - 8.5 g/dL Final   • Albumin 11/29/2021 4.8  4.0 - 5.0 g/dL Final   • Globulin 11/29/2021 2.5  1.5 - 4.5 g/dL Final   • A/G Ratio 11/29/2021 1.9  1.2 - 2.2 Final   • Total Bilirubin 11/29/2021 1.2  0.0 - 1.2 mg/dL Final   • Alkaline Phosphatase 11/29/2021 55  44 - 121 IU/L Final                  **Please note reference interval change**   • AST (SGOT) 11/29/2021 25  0 - 40 IU/L Final   • ALT (SGPT) 11/29/2021 43  0 - 44 IU/L Final   • Total Cholesterol 11/29/2021 247 (A) 100 - 199 mg/dL Final   • Triglycerides 11/29/2021 152 (A) 0 - 149 mg/dL Final   • HDL Cholesterol 11/29/2021 44  >39 mg/dL Final   • VLDL Cholesterol Henok 11/29/2021 28  5 - 40 mg/dL Final   • LDL Chol Calc ([facility]) 11/29/2021 175 (A) 0 - 99 mg/dL Final   • Hemoglobin A1C 11/29/2021 7.3 (A) 4.8 - 5.6 % Final    Comment:          Prediabetes: 5.7 - 6.4           Diabetes: >6.4           Glycemic control for adults with diabetes: <7.0     • TSH 11/29/2021 2.630  0.450 - 4.500 uIU/mL Final   • Free T4 11/29/2021 1.05  0.82 - 1.77 ng/dL Final   • Creatinine, Urine 11/29/2021 209.0  Not Estab. mg/dL Final    • Microalbumin, Urine 11/29/2021 9.3  Not Estab. ug/mL Final   • Microalbumin/Creatinine Ratio 11/29/2021 4  0 - 29 mg/g creat Final    Comment:                        Normal:                0 -  29                         Moderately increased: 30 - 300                         Severely increased:       >300     • Interpretation 11/29/2021 Note   Final    Supplemental report is available.     Assessment & Plan   Diagnoses and all orders for this visit:    1. Type 1 diabetes mellitus without complication (HCC) (Primary)  -     Comprehensive Metabolic Panel  -     Hemoglobin A1c  -     TSH  -     T4, Free    2. Gilbert's syndrome  -     Comprehensive Metabolic Panel    3. Other hyperlipidemia  -     Comprehensive Metabolic Panel  -     Lipid Panel      Continue Tresiba and insulin lispro.  Take insulin lispro at the start of the meal.  Discussed about insulin pumps.  Continue Lipitor 10 mg/day.     Copy of my note sent to Dr. Cox.    RTC 4 mos.

## 2022-05-11 ENCOUNTER — OFFICE VISIT (OUTPATIENT)
Dept: ENDOCRINOLOGY | Age: 42
End: 2022-05-11

## 2022-05-11 VITALS
HEIGHT: 74 IN | BODY MASS INDEX: 31.93 KG/M2 | OXYGEN SATURATION: 98 % | HEART RATE: 82 BPM | WEIGHT: 248.8 LBS | SYSTOLIC BLOOD PRESSURE: 125 MMHG | DIASTOLIC BLOOD PRESSURE: 72 MMHG

## 2022-05-11 DIAGNOSIS — E80.4 GILBERT'S SYNDROME: ICD-10-CM

## 2022-05-11 DIAGNOSIS — E78.49 OTHER HYPERLIPIDEMIA: ICD-10-CM

## 2022-05-11 DIAGNOSIS — E10.9 TYPE 1 DIABETES MELLITUS WITHOUT COMPLICATION: Primary | ICD-10-CM

## 2022-05-11 PROBLEM — J01.00 SUBACUTE MAXILLARY SINUSITIS: Status: RESOLVED | Noted: 2017-01-26 | Resolved: 2022-05-11

## 2022-05-11 PROCEDURE — 99214 OFFICE O/P EST MOD 30 MIN: CPT | Performed by: INTERNAL MEDICINE

## 2022-05-11 RX ORDER — FLURBIPROFEN SODIUM 0.3 MG/ML
SOLUTION/ DROPS OPHTHALMIC
Qty: 400 EACH | Refills: 1 | Status: SHIPPED | OUTPATIENT
Start: 2022-05-11 | End: 2023-03-10 | Stop reason: SDUPTHER

## 2022-05-12 RX ORDER — INSULIN DEGLUDEC INJECTION 100 U/ML
INJECTION, SOLUTION SUBCUTANEOUS
Qty: 15 ML | Refills: 4 | OUTPATIENT
Start: 2022-05-12

## 2022-05-12 NOTE — TELEPHONE ENCOUNTER
Rx Refill Note  Requested Prescriptions     Pending Prescriptions Disp Refills   • Tresiba FlexTouch 100 UNIT/ML solution pen-injector injection [Pharmacy Med Name: TRESIBA FLEXTOUCH PEN 3ML 5'S 100U/ML] 15 mL 4     Sig: INJECT 22 UNITS UNDER THE SKIN INTO APPROPRIATE AREA AS DIRECTED DAILY      Last office visit with prescribing clinician: Visit date not found      Next office visit with prescribing clinician: Visit date not found            Los Joseph MA  05/12/22, 07:49 EDT

## 2022-05-12 NOTE — TELEPHONE ENCOUNTER
Hub please inform patient  Called pt no answer left vm to refill his meds he needs to make an appointment here with  or have his endocrinologist fill them.

## 2022-06-07 RX ORDER — PROCHLORPERAZINE 25 MG/1
SUPPOSITORY RECTAL
Qty: 9 EACH | Refills: 3 | Status: SHIPPED | OUTPATIENT
Start: 2022-06-07

## 2022-06-07 RX ORDER — PROCHLORPERAZINE 25 MG/1
1 SUPPOSITORY RECTAL
Qty: 1 EACH | Refills: 3 | Status: SHIPPED | OUTPATIENT
Start: 2022-06-07 | End: 2022-06-23 | Stop reason: SDUPTHER

## 2022-06-07 NOTE — TELEPHONE ENCOUNTER
Rx Refill Note  Requested Prescriptions     Pending Prescriptions Disp Refills    Continuous Blood Gluc Sensor (Dexcom G6 Sensor) [Pharmacy Med Name: DEXCOM G6 SENSOR 3'S] 9 each 3     Sig: APPLY TOPICALLY EVERY 10 DAYS      Last office visit with prescribing clinician: Bushra 3 weeks ago       Next office visit with prescribing clinician: Busrha in 5 months           Norah Garcia  06/07/22, 07:13 EDT

## 2022-06-23 ENCOUNTER — TELEPHONE (OUTPATIENT)
Dept: ENDOCRINOLOGY | Age: 42
End: 2022-06-23

## 2022-06-23 RX ORDER — PROCHLORPERAZINE 25 MG/1
1 SUPPOSITORY RECTAL
Qty: 1 EACH | Refills: 3 | Status: SHIPPED | OUTPATIENT
Start: 2022-06-23

## 2022-06-27 ENCOUNTER — TELEPHONE (OUTPATIENT)
Dept: ENDOCRINOLOGY | Age: 42
End: 2022-06-27

## 2022-06-28 RX ORDER — INSULIN DEGLUDEC INJECTION 100 U/ML
24 INJECTION, SOLUTION SUBCUTANEOUS DAILY
Qty: 21.6 ML | Refills: 1 | Status: SHIPPED | OUTPATIENT
Start: 2022-06-28 | End: 2022-07-06 | Stop reason: SDUPTHER

## 2022-07-05 ENCOUNTER — TELEPHONE (OUTPATIENT)
Dept: ENDOCRINOLOGY | Age: 42
End: 2022-07-05

## 2022-07-06 RX ORDER — INSULIN DEGLUDEC INJECTION 100 U/ML
24 INJECTION, SOLUTION SUBCUTANEOUS DAILY
Qty: 1 PEN | Refills: 0 | Status: SHIPPED | OUTPATIENT
Start: 2022-07-06 | End: 2022-11-18

## 2022-08-30 ENCOUNTER — TELEPHONE (OUTPATIENT)
Dept: ENDOCRINOLOGY | Age: 42
End: 2022-08-30

## 2022-08-30 RX ORDER — INSULIN LISPRO 100 [IU]/ML
INJECTION, SOLUTION INTRAVENOUS; SUBCUTANEOUS
Qty: 3 ML | Refills: 0 | Status: SHIPPED | OUTPATIENT
Start: 2022-08-30 | End: 2022-10-03 | Stop reason: SDUPTHER

## 2022-08-30 NOTE — TELEPHONE ENCOUNTER
PT CALLED IN AND ASKED FOR A REFILL ON HIS HUMALOG QUICK PEN. HE SAID THAT [employer] SCRIPT IS TAKING TOO LONG AND HE IS OUT OF THE MEDICATION. HE IS WANTING TO KNOW IF YOU CAN SEND HIM ONE PEN SENT TO [id number] - [place], [place] - [address] AT [facility] - [phone]  - [phone]    [address], [facility] [address]   Phone:  [phone]  Fax:  [phone]

## 2022-10-03 RX ORDER — INSULIN LISPRO 200 [IU]/ML
24 INJECTION, SOLUTION SUBCUTANEOUS DAILY
Qty: 10.8 ML | Refills: 1 | Status: SHIPPED | OUTPATIENT
Start: 2022-10-03 | End: 2022-10-13 | Stop reason: SDUPTHER

## 2022-10-03 RX ORDER — INSULIN LISPRO 100 [IU]/ML
INJECTION, SOLUTION INTRAVENOUS; SUBCUTANEOUS
Qty: 3 ML | Refills: 0 | Status: CANCELLED | OUTPATIENT
Start: 2022-10-03

## 2022-10-13 RX ORDER — INSULIN LISPRO 200 [IU]/ML
24 INJECTION, SOLUTION SUBCUTANEOUS DAILY
Qty: 10.8 ML | Refills: 1 | Status: SHIPPED | OUTPATIENT
Start: 2022-10-13 | End: 2022-11-18

## 2022-11-17 NOTE — PROGRESS NOTES
Subjective   Erasmo Kelley is a 42 y.o. male.     History of Present Illness   F/u for dm 1, hyperlipidemia / testing bs using the dexcom 6 / last dm eye exam 3/18/21 with dr Norris  / last foot exam 5/11/22 with dr Tomlinson         Patient is a 42 year old who came in for follow-up.     He was diagnosed to have diabetes mellitus in February 2018 with the 5-8 month history of increased thirst, polyuria, and weight loss of 35 pounds.  Random glucose done in February 2018 was elevated at 321 and hemoglobin A1c was 12.50%.  DOMINGO antibody was elevated at 689 units per mL.  He has gained 5 lbs since 5/22.  He does not exercise regularly.     He is on Tresiba 24 units every AM and Insulin lispro 8-10 TID with meals.  He has seen the diabetic educator.  He is using a Dexcom 6 sensor.  He has few hypoglycemic episode.  He is interested in using an Omnipod.  His last meal was last night.     Sensor data reviewed.  Average glucose 179.  Time in target 40%.  Time above target 51%.  Time below target less than 1%.  Blood sugars were higher when he had a recent respiratory infection and has come down since he has recovered from the illness.  Fasting glucose .  He has postprandial hyperglycemia after lunch and after supper     His last eye examination was in March 2021.  He has no retinopathy..   He denies any numbness, tingling or burning sensation in his hands or feet.  Urine microalbumin was normal in 11/21.     He has no history of hypertension.   He has hyperlipidemia and is on diet alone.       He does not smoke cigarettes.  He drinks 6-8 beers per week depending on social occasion.  He denies alcohol-related disease.  He denies drug abuse.  He walks a lot at home and at work.     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      The following portions of the patient's history were reviewed and updated as appropriate: allergies, current medications, past family history, past medical history, past social history, past  "surgical history and problem list.    Review of Systems   Eyes: Negative for visual disturbance.   Respiratory: Negative for shortness of breath.    Cardiovascular: Negative for chest pain and palpitations.   Gastrointestinal: Negative.    Endocrine: Negative for cold intolerance and heat intolerance.   Genitourinary: Negative.    Musculoskeletal: Negative for myalgias.   Neurological: Negative for numbness.   Hematological: Negative for adenopathy. Does not bruise/bleed easily.     Vitals:    11/18/22 0838   BP: 122/84   Pulse: 86   Temp: 97.5 °F (36.4 °C)   TempSrc: Temporal   SpO2: 96%   Weight: 115 kg (253 lb 12.8 oz)   Height: 188 cm (74.02\")      Objective   Physical Exam  Constitutional:       General: He is not in acute distress.     Appearance: Normal appearance. He is not ill-appearing, toxic-appearing or diaphoretic.   Eyes:      General: No scleral icterus.        Right eye: No discharge.         Left eye: No discharge.      Extraocular Movements: Extraocular movements intact.      Conjunctiva/sclera: Conjunctivae normal.   Neck:      Vascular: No carotid bruit.   Cardiovascular:      Rate and Rhythm: Normal rate and regular rhythm.      Pulses: Normal pulses.      Heart sounds: Normal heart sounds.   Pulmonary:      Effort: Pulmonary effort is normal.      Breath sounds: Normal breath sounds. No rales.   Chest:      Chest wall: No tenderness.   Abdominal:      General: Bowel sounds are normal.      Palpations: Abdomen is soft.      Tenderness: There is no right CVA tenderness or left CVA tenderness.   Musculoskeletal:         General: Normal range of motion.      Right lower leg: No edema.      Left lower leg: No edema.   Lymphadenopathy:      Cervical: No cervical adenopathy.   Skin:     General: Skin is warm and dry.      Coloration: Skin is not jaundiced or pale.      Findings: No lesion or rash.   Neurological:      General: No focal deficit present.      Mental Status: He is alert and oriented to " person, place, and time.   Psychiatric:         Mood and Affect: Mood normal.         Behavior: Behavior normal.       Office Visit on 11/29/2021   Component Date Value Ref Range Status   • Glucose 11/29/2021 197 (H)  65 - 99 mg/dL Final   • BUN 11/29/2021 15  6 - 20 mg/dL Final   • Creatinine 11/29/2021 0.95  0.76 - 1.27 mg/dL Final   • eGFR Non African Am 11/29/2021 100  >59 mL/min/1.73 Final   • eGFR African Am 11/29/2021 116  >59 mL/min/1.73 Final    Comment: **In accordance with recommendations from the NKF-ASN Task force,**    [facility] is in the process of updating its eGFR calculation to the    2021 CKD-EPI creatinine equation that estimates kidney function    without a race variable.     • BUN/Creatinine Ratio 11/29/2021 16  9 - 20 Final   • Sodium 11/29/2021 140  134 - 144 mmol/L Final   • Potassium 11/29/2021 4.5  3.5 - 5.2 mmol/L Final   • Chloride 11/29/2021 102  96 - 106 mmol/L Final   • Total CO2 11/29/2021 23  20 - 29 mmol/L Final   • Calcium 11/29/2021 9.5  8.7 - 10.2 mg/dL Final   • Total Protein 11/29/2021 7.3  6.0 - 8.5 g/dL Final   • Albumin 11/29/2021 4.8  4.0 - 5.0 g/dL Final   • Globulin 11/29/2021 2.5  1.5 - 4.5 g/dL Final   • A/G Ratio 11/29/2021 1.9  1.2 - 2.2 Final   • Total Bilirubin 11/29/2021 1.2  0.0 - 1.2 mg/dL Final   • Alkaline Phosphatase 11/29/2021 55  44 - 121 IU/L Final                  **Please note reference interval change**   • AST (SGOT) 11/29/2021 25  0 - 40 IU/L Final   • ALT (SGPT) 11/29/2021 43  0 - 44 IU/L Final   • Total Cholesterol 11/29/2021 247 (H)  100 - 199 mg/dL Final   • Triglycerides 11/29/2021 152 (H)  0 - 149 mg/dL Final   • HDL Cholesterol 11/29/2021 44  >39 mg/dL Final   • VLDL Cholesterol Henok 11/29/2021 28  5 - 40 mg/dL Final   • LDL Chol Calc ([facility]) 11/29/2021 175 (H)  0 - 99 mg/dL Final   • Hemoglobin A1C 11/29/2021 7.3 (H)  4.8 - 5.6 % Final    Comment:          Prediabetes: 5.7 - 6.4           Diabetes: >6.4           Glycemic control for adults with  diabetes: <7.0     • TSH 11/29/2021 2.630  0.450 - 4.500 uIU/mL Final   • Free T4 11/29/2021 1.05  0.82 - 1.77 ng/dL Final   • Creatinine, Urine 11/29/2021 209.0  Not Estab. mg/dL Final   • Microalbumin, Urine 11/29/2021 9.3  Not Estab. ug/mL Final   • Microalbumin/Creatinine Ratio 11/29/2021 4  0 - 29 mg/g creat Final    Comment:                        Normal:                0 -  29                         Moderately increased: 30 - 300                         Severely increased:       >300     • Interpretation 11/29/2021 Note   Final    Supplemental report is available.     Assessment & Plan   Diagnoses and all orders for this visit:    1. Type 1 diabetes mellitus without complication (HCC) (Primary)  -     Comprehensive Metabolic Panel  -     Lipid Panel  -     Hemoglobin A1c  -     TSH  -     T4, Free  -     Microalbumin / Creatinine Urine Ratio - Urine, Clean Catch  -     Celiac Disease Panel    2. Autoimmune diabetes (HCC)  -     Comprehensive Metabolic Panel  -     Lipid Panel  -     Hemoglobin A1c  -     TSH  -     T4, Free    3. Other hyperlipidemia  -     Lipid Panel  -     TSH  -     T4, Free    4. Gilbert's syndrome  -     Comprehensive Metabolic Panel      Increase Tresiba to 26 units every morning.  Increase lispro insulin to 10 to 12 units with each meal.  Check hemoglobin A1c and urine microalbumin.  Check lipid panel and thyroid function tests.  Advised to have a yearly eye examination.  Flu vaccine this fall.    Copy of my note sent to Dr. Cox.    RTC 4 mos.

## 2022-11-18 ENCOUNTER — OFFICE VISIT (OUTPATIENT)
Dept: ENDOCRINOLOGY | Age: 42
End: 2022-11-18

## 2022-11-18 VITALS
HEART RATE: 86 BPM | SYSTOLIC BLOOD PRESSURE: 122 MMHG | DIASTOLIC BLOOD PRESSURE: 84 MMHG | WEIGHT: 253.8 LBS | OXYGEN SATURATION: 96 % | BODY MASS INDEX: 32.57 KG/M2 | HEIGHT: 74 IN | TEMPERATURE: 97.5 F

## 2022-11-18 DIAGNOSIS — E10.9 AUTOIMMUNE DIABETES: ICD-10-CM

## 2022-11-18 DIAGNOSIS — E78.49 OTHER HYPERLIPIDEMIA: ICD-10-CM

## 2022-11-18 DIAGNOSIS — E10.9 TYPE 1 DIABETES MELLITUS WITHOUT COMPLICATION: Primary | ICD-10-CM

## 2022-11-18 DIAGNOSIS — E80.4 GILBERT'S SYNDROME: ICD-10-CM

## 2022-11-18 LAB
ALBUMIN SERPL-MCNC: 4.8 G/DL (ref 3.5–5.2)
ALBUMIN/GLOB SERPL: 2.2 G/DL
ALP SERPL-CCNC: 55 U/L (ref 39–117)
ALT SERPL-CCNC: 41 U/L (ref 1–41)
AST SERPL-CCNC: 28 U/L (ref 1–40)
BILIRUB SERPL-MCNC: 1.7 MG/DL (ref 0–1.2)
BUN SERPL-MCNC: 12 MG/DL (ref 6–20)
BUN/CREAT SERPL: 10.6 (ref 7–25)
CALCIUM SERPL-MCNC: 9.4 MG/DL (ref 8.6–10.5)
CHLORIDE SERPL-SCNC: 104 MMOL/L (ref 98–107)
CHOLEST SERPL-MCNC: 243 MG/DL (ref 0–200)
CO2 SERPL-SCNC: 27.4 MMOL/L (ref 22–29)
CREAT SERPL-MCNC: 1.13 MG/DL (ref 0.76–1.27)
EGFRCR SERPLBLD CKD-EPI 2021: 84.3 ML/MIN/1.73
GLOBULIN SER CALC-MCNC: 2.2 GM/DL
GLUCOSE SERPL-MCNC: 153 MG/DL (ref 65–99)
HBA1C MFR BLD: 7.6 % (ref 4.8–5.6)
HDLC SERPL-MCNC: 50 MG/DL (ref 40–60)
IMP & REVIEW OF LAB RESULTS: NORMAL
LDLC SERPL CALC-MCNC: 172 MG/DL (ref 0–100)
POTASSIUM SERPL-SCNC: 4.6 MMOL/L (ref 3.5–5.2)
PROT SERPL-MCNC: 7 G/DL (ref 6–8.5)
SODIUM SERPL-SCNC: 140 MMOL/L (ref 136–145)
T4 FREE SERPL-MCNC: 0.96 NG/DL (ref 0.93–1.7)
TRIGL SERPL-MCNC: 117 MG/DL (ref 0–150)
TSH SERPL DL<=0.005 MIU/L-ACNC: 3.14 UIU/ML (ref 0.27–4.2)
UNABLE TO VOID: NORMAL
VLDLC SERPL CALC-MCNC: 21 MG/DL (ref 5–40)

## 2022-11-18 PROCEDURE — 95251 CONT GLUC MNTR ANALYSIS I&R: CPT | Performed by: INTERNAL MEDICINE

## 2022-11-18 PROCEDURE — 99214 OFFICE O/P EST MOD 30 MIN: CPT | Performed by: INTERNAL MEDICINE

## 2022-11-18 RX ORDER — INSULIN LISPRO 200 [IU]/ML
10-12 INJECTION, SOLUTION SUBCUTANEOUS
Qty: 13 ML | Refills: 6 | Status: SHIPPED | OUTPATIENT
Start: 2022-11-18 | End: 2023-01-07 | Stop reason: SDUPTHER

## 2022-11-18 RX ORDER — INSULIN DEGLUDEC INJECTION 100 U/ML
26 INJECTION, SOLUTION SUBCUTANEOUS DAILY
Qty: 7.8 ML | Refills: 5 | Status: SHIPPED | OUTPATIENT
Start: 2022-11-18 | End: 2023-03-10 | Stop reason: SDUPTHER

## 2022-11-20 DIAGNOSIS — E78.5 HYPERLIPIDEMIA, UNSPECIFIED HYPERLIPIDEMIA TYPE: Primary | ICD-10-CM

## 2022-11-20 RX ORDER — ATORVASTATIN CALCIUM 20 MG/1
20 TABLET, FILM COATED ORAL DAILY
Qty: 30 TABLET | Refills: 5 | Status: SHIPPED | OUTPATIENT
Start: 2022-11-20 | End: 2023-03-12 | Stop reason: ALTCHOICE

## 2022-11-20 NOTE — PROGRESS NOTES
LDL elevated 172.  HDL 50.  Cholesterol 243.  On Lipitor 10 mg/day.  Increase Lipitor to 20 mg/day.  Repeat CMP and lipid panel in 6 weeks.  Prescription sent to pharmacy.  Orders placed on chart.  Hemoglobin A1c higher at 7.6%.  Normal thyroid function tests.  Copy of labs sent to Dr. Cox.  Please notify patient of results and instructions.

## 2022-11-21 LAB
ENDOMYSIUM IGA SER QL: NEGATIVE
IGA SERPL-MCNC: 162 MG/DL (ref 90–386)
TTG IGA SER-ACNC: <2 U/ML (ref 0–3)

## 2022-11-22 ENCOUNTER — TELEPHONE (OUTPATIENT)
Dept: ENDOCRINOLOGY | Age: 42
End: 2022-11-22

## 2022-11-22 NOTE — TELEPHONE ENCOUNTER
PT CALLED RETURNING A CALL ABOUT HIS LAB RESULTS AND PT ASKED IF WE COULD JUST E-MAIL THEM TO HIM AT [email or web address] OR GIVE HIM A CALL - [phone]

## 2023-01-07 RX ORDER — INSULIN LISPRO 200 [IU]/ML
10-12 INJECTION, SOLUTION SUBCUTANEOUS
Qty: 16.2 ML | Refills: 1 | Status: SHIPPED | OUTPATIENT
Start: 2023-01-07 | End: 2023-04-07

## 2023-03-10 ENCOUNTER — OFFICE VISIT (OUTPATIENT)
Dept: ENDOCRINOLOGY | Age: 43
End: 2023-03-10
Payer: COMMERCIAL

## 2023-03-10 VITALS
SYSTOLIC BLOOD PRESSURE: 120 MMHG | TEMPERATURE: 97.3 F | OXYGEN SATURATION: 97 % | BODY MASS INDEX: 31.98 KG/M2 | WEIGHT: 249.2 LBS | HEART RATE: 81 BPM | DIASTOLIC BLOOD PRESSURE: 70 MMHG | HEIGHT: 74 IN

## 2023-03-10 DIAGNOSIS — E10.9 TYPE 1 DIABETES MELLITUS WITHOUT COMPLICATION: Primary | ICD-10-CM

## 2023-03-10 DIAGNOSIS — E78.49 OTHER HYPERLIPIDEMIA: ICD-10-CM

## 2023-03-10 DIAGNOSIS — E80.4 GILBERT'S SYNDROME: ICD-10-CM

## 2023-03-10 LAB
ALBUMIN SERPL-MCNC: 4.9 G/DL (ref 3.5–5.2)
ALBUMIN/GLOB SERPL: 2.3 G/DL
ALP SERPL-CCNC: 47 U/L (ref 39–117)
ALT SERPL-CCNC: 43 U/L (ref 1–41)
AST SERPL-CCNC: 22 U/L (ref 1–40)
BILIRUB SERPL-MCNC: 1.9 MG/DL (ref 0–1.2)
BUN SERPL-MCNC: 14 MG/DL (ref 6–20)
BUN/CREAT SERPL: 12.7 (ref 7–25)
CALCIUM SERPL-MCNC: 10.1 MG/DL (ref 8.6–10.5)
CHLORIDE SERPL-SCNC: 104 MMOL/L (ref 98–107)
CHOLEST SERPL-MCNC: 221 MG/DL (ref 0–200)
CO2 SERPL-SCNC: 26.4 MMOL/L (ref 22–29)
CREAT SERPL-MCNC: 1.1 MG/DL (ref 0.76–1.27)
EGFRCR SERPLBLD CKD-EPI 2021: 87 ML/MIN/1.73
GLOBULIN SER CALC-MCNC: 2.1 GM/DL
GLUCOSE SERPL-MCNC: 120 MG/DL (ref 65–99)
HBA1C MFR BLD: 6.9 % (ref 4.8–5.6)
HDLC SERPL-MCNC: 47 MG/DL (ref 40–60)
IMP & REVIEW OF LAB RESULTS: NORMAL
LDLC SERPL CALC-MCNC: 156 MG/DL (ref 0–100)
POTASSIUM SERPL-SCNC: 4.3 MMOL/L (ref 3.5–5.2)
PROT SERPL-MCNC: 7 G/DL (ref 6–8.5)
SODIUM SERPL-SCNC: 141 MMOL/L (ref 136–145)
T4 FREE SERPL-MCNC: 0.94 NG/DL (ref 0.93–1.7)
TRIGL SERPL-MCNC: 101 MG/DL (ref 0–150)
TSH SERPL DL<=0.005 MIU/L-ACNC: 5.4 UIU/ML (ref 0.27–4.2)
VLDLC SERPL CALC-MCNC: 18 MG/DL (ref 5–40)

## 2023-03-10 PROCEDURE — 95251 CONT GLUC MNTR ANALYSIS I&R: CPT | Performed by: INTERNAL MEDICINE

## 2023-03-10 PROCEDURE — 99214 OFFICE O/P EST MOD 30 MIN: CPT | Performed by: INTERNAL MEDICINE

## 2023-03-10 RX ORDER — INSULIN DEGLUDEC INJECTION 100 U/ML
INJECTION, SOLUTION SUBCUTANEOUS
Qty: 27 ML | Refills: 2 | Status: SHIPPED | OUTPATIENT
Start: 2023-03-10

## 2023-03-10 RX ORDER — FLURBIPROFEN SODIUM 0.3 MG/ML
SOLUTION/ DROPS OPHTHALMIC
Qty: 400 EACH | Refills: 3 | Status: SHIPPED | OUTPATIENT
Start: 2023-03-10

## 2023-03-10 NOTE — PROGRESS NOTES
Subjective   Erasmo Kelley is a 42 y.o. male.     History of Present Illness     Patient is a 42 year old who came in for follow-up.     He was diagnosed to have diabetes mellitus in February 2018 with the 5-8 month history of increased thirst, polyuria, and weight loss of 35 pounds.  Random glucose done in February 2018 was elevated at 321 and hemoglobin A1c was 12.50%.  DOMINGO antibody was elevated at 689 units per mL.      He has lost 4 lbs since 11/22.  He does not exercise regularly.     He is on Tresiba 26 units every AM and Insulin lispro 10-12 TID with meals.  He has seen the diabetic educator.  He is using a Dexcom 6 sensor.  He has few hypoglycemic episode.  His last meal was last night.     Sensor data reviewed.  Average glucose 171 mg per DL.  Time in target 64%.  Time above target 35%.  Time below target less than 1%.  He has intermittent postprandial hyperglycemia mostly after lunch.     His last eye examination was in 12/22.  He has no retinopathy..   He denies any numbness, tingling or burning sensation in his hands or feet.  Urine microalbumin was normal in 11/21.     He has no history of hypertension.       He has hyperlipidemia and is on diet alone.       He does not smoke cigarettes.  He drinks 12 beers per week depending on social occasion.  He denies alcohol-related disease.  He denies drug abuse.  He walks a lot at home and at work.     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    The following portions of the patient's history were reviewed and updated as appropriate: allergies, current medications, past family history, past medical history, past social history, past surgical history and problem list.    Review of Systems   Eyes: Negative for visual disturbance.   Respiratory: Negative for shortness of breath.    Cardiovascular: Negative for chest pain and palpitations.   Gastrointestinal: Negative.    Endocrine: Negative for cold intolerance and heat intolerance.   Genitourinary: Negative.   "  Musculoskeletal: Negative for myalgias.   Neurological: Negative for numbness.   Hematological: Negative for adenopathy.     Vitals:    03/10/23 0825   BP: 120/70   Pulse: 81   Temp: 97.3 °F (36.3 °C)   TempSrc: Temporal   SpO2: 97%   Weight: 113 kg (249 lb 3.2 oz)   Height: 188 cm (74\")      Objective   Physical Exam  Constitutional:       General: He is not in acute distress.     Appearance: Normal appearance. He is obese. He is not ill-appearing, toxic-appearing or diaphoretic.   Eyes:      General: No scleral icterus.        Right eye: No discharge.         Left eye: No discharge.   Neck:      Vascular: No carotid bruit.   Cardiovascular:      Rate and Rhythm: Normal rate and regular rhythm.      Heart sounds: Normal heart sounds. No murmur heard.    No friction rub.   Pulmonary:      Effort: No respiratory distress.      Breath sounds: Normal breath sounds. No stridor. No rales.   Chest:      Chest wall: No tenderness.   Abdominal:      General: Bowel sounds are normal. There is no distension.      Palpations: Abdomen is soft. There is no mass.      Tenderness: There is no right CVA tenderness or left CVA tenderness.   Musculoskeletal:      Right lower leg: No edema.      Left lower leg: No edema.      Comments: No plantar ulcer   Lymphadenopathy:      Cervical: No cervical adenopathy.   Skin:     General: Skin is warm and dry.   Neurological:      General: No focal deficit present.      Mental Status: He is alert and oriented to person, place, and time.      Comments: Intact light touch on lower ext.   Psychiatric:         Mood and Affect: Mood normal.         Behavior: Behavior normal.       Office Visit on 11/18/2022   Component Date Value Ref Range Status   • Glucose 11/18/2022 153 (H)  65 - 99 mg/dL Final   • BUN 11/18/2022 12  6 - 20 mg/dL Final   • Creatinine 11/18/2022 1.13  0.76 - 1.27 mg/dL Final   • EGFR Result 11/18/2022 84.3  >60.0 mL/min/1.73 Final    Comment: National Kidney Foundation and " American Society of  Nephrology (ASN) Task Force recommended calculation based  on the Chronic Kidney Disease Epidemiology Collaboration  (CKD-EPI) equation refit without adjustment for race.  GFR Normal >60  Chronic Kidney Disease <60  Kidney Failure <15     • BUN/Creatinine Ratio 11/18/2022 10.6  7.0 - 25.0 Final   • Sodium 11/18/2022 140  136 - 145 mmol/L Final   • Potassium 11/18/2022 4.6  3.5 - 5.2 mmol/L Final   • Chloride 11/18/2022 104  98 - 107 mmol/L Final   • Total CO2 11/18/2022 27.4  22.0 - 29.0 mmol/L Final   • Calcium 11/18/2022 9.4  8.6 - 10.5 mg/dL Final   • Total Protein 11/18/2022 7.0  6.0 - 8.5 g/dL Final   • Albumin 11/18/2022 4.80  3.50 - 5.20 g/dL Final   • Globulin 11/18/2022 2.2  gm/dL Final   • A/G Ratio 11/18/2022 2.2  g/dL Final   • Total Bilirubin 11/18/2022 1.7 (H)  0.0 - 1.2 mg/dL Final   • Alkaline Phosphatase 11/18/2022 55  39 - 117 U/L Final   • AST (SGOT) 11/18/2022 28  1 - 40 U/L Final   • ALT (SGPT) 11/18/2022 41  1 - 41 U/L Final   • Total Cholesterol 11/18/2022 243 (H)  0 - 200 mg/dL Final    Comment: Cholesterol Reference Ranges  (U.S. Department of Health and Human Services ATP III  Classifications)  Desirable          <200 mg/dL  Borderline High    200-239 mg/dL  High Risk          >240 mg/dL  Triglyceride Reference Ranges  (U.S. Department of Health and Human Services ATP III  Classifications)  Normal           <150 mg/dL  Borderline High  150-199 mg/dL  High             200-499 mg/dL  Very High        >500 mg/dL  HDL Reference Ranges  (U.S. Department of Health and Human Services ATP III  Classifications)  Low     <40 mg/dl (major risk factor for CHD)  High    >60 mg/dl ('negative' risk factor for CHD)  LDL Reference Ranges  (U.S. Department of Health and Human Services ATP III  Classifications)  Optimal          <100 mg/dL  Near Optimal     100-129 mg/dL  Borderline High  130-159 mg/dL  High             160-189 mg/dL  Very High        >189 mg/dL     • Triglycerides  11/18/2022 117  0 - 150 mg/dL Final   • HDL Cholesterol 11/18/2022 50  40 - 60 mg/dL Final   • VLDL Cholesterol Hen[place] 11/18/2022 21  5 - 40 mg/dL Final   • LDL Chol Calc (RUST) 11/18/2022 172 (H)  0 - 100 mg/dL Final   • Hemoglobin A1C 11/18/2022 7.60 (H)  4.80 - 5.60 % Final    Comment: Hemoglobin A1C Ranges:  Increased Risk for Diabetes  5.7% to 6.4%  Diabetes                     >= 6.5%  Diabetic Goal                < 7.0%     • TSH 11/18/2022 3.140  0.270 - 4.200 uIU/mL Final   • Free T4 11/18/2022 0.96  0.93 - 1.70 ng/dL Final    Results may be falsely increased if patient taking Biotin.   • Endomysial IgA 11/18/2022 Negative  Negative Final   • Tissue Transglutaminase IgA 11/18/2022 <2  0 - 3 U/mL Final    Comment:                               Negative        0 -  3                                Weak Positive   4 - 10                                Positive           >10   Tissue Transglutaminase (tTG) has been identified   as the endomysial antigen.  Studies have demonstr-   ated that endomysial IgA antibodies have over 99%   specificity for gluten sensitive enteropathy.     • IgA 11/18/2022 162  90 - 386 mg/dL Final   • Interpretation 11/18/2022 Note   Final    Supplemental report is available.   • Unable to Void 11/18/2022 Comment   Final    Comment: The patient was not able to render a urine sample and has been  instructed to return for a urine collection at their earliest  convenience.  The urine testing that you have requested has  been deleted from this report.  When the patient returns and  provides a urine specimen, the urine testing will be performed  and separately reported.       Assessment & Plan   Diagnoses and all orders for this visit:    1. Type 1 diabetes mellitus without complication (HCC) (Primary)  -     Comprehensive Metabolic Panel  -     Lipid Panel  -     TSH  -     T4, Free  -     Hemoglobin A1c    2. Gilbert's syndrome  -     Comprehensive Metabolic Panel    3. Other hyperlipidemia  -      Lipid Panel  -     TSH  -     T4, Free    Other orders  -     insulin degludec (Tresiba FlexTouch) 100 UNIT/ML solution pen-injector injection; 28 units every morning  Dispense: 27 mL; Refill: 2  -     Insulin Pen Needle (B-D UF III MINI PEN NEEDLES) 31G X 5 MM misc; Using 4 pen needles daily  Dispense: 400 each; Refill: 3      Increase Tresiba to 28 units every morning.  Continue mealtime lispro insulin 3 times daily.  Check lipid panel and consider restarting statin.    Copy of my note sent to Dr. Cox.    RTC 4 mos.

## 2023-03-12 DIAGNOSIS — R79.89 ELEVATED TSH: ICD-10-CM

## 2023-03-12 DIAGNOSIS — E78.49 OTHER HYPERLIPIDEMIA: Primary | ICD-10-CM

## 2023-03-12 RX ORDER — ROSUVASTATIN CALCIUM 5 MG/1
TABLET, COATED ORAL
Qty: 30 TABLET | Refills: 5 | Status: SHIPPED | OUTPATIENT
Start: 2023-03-12

## 2023-03-12 NOTE — PROGRESS NOTES
.  HDL 47.  Cholesterol 221.  LDL elevated.  Start Crestor 5 mg every evening.  Patient did not start on Lipitor in the past.  Prescription sent to pharmacy.  TSH 5.4.  TSH mildly elevated  Repeat CMP, lipid panel, free T4, TSH, and thyroid peroxidase antibody in 2 months.  Orders placed on chart.  Hemoglobin A1c improved at 6.9%.  Copy of labs sent to Dr. Cox and to patient through [employer].

## 2023-05-16 DIAGNOSIS — R79.89 ELEVATED TSH: ICD-10-CM

## 2023-05-16 DIAGNOSIS — E78.49 OTHER HYPERLIPIDEMIA: ICD-10-CM

## 2023-05-17 LAB
ALBUMIN SERPL-MCNC: 4.8 G/DL (ref 3.5–5.2)
ALBUMIN/GLOB SERPL: 2.4 G/DL
ALP SERPL-CCNC: 48 U/L (ref 39–117)
ALT SERPL-CCNC: 36 U/L (ref 1–41)
AST SERPL-CCNC: 22 U/L (ref 1–40)
BILIRUB SERPL-MCNC: 1.5 MG/DL (ref 0–1.2)
BUN SERPL-MCNC: 16 MG/DL (ref 6–20)
BUN/CREAT SERPL: 14.8 (ref 7–25)
CALCIUM SERPL-MCNC: 9.8 MG/DL (ref 8.6–10.5)
CHLORIDE SERPL-SCNC: 108 MMOL/L (ref 98–107)
CHOLEST SERPL-MCNC: 134 MG/DL (ref 0–200)
CO2 SERPL-SCNC: 24.3 MMOL/L (ref 22–29)
CREAT SERPL-MCNC: 1.08 MG/DL (ref 0.76–1.27)
EGFRCR SERPLBLD CKD-EPI 2021: 88.4 ML/MIN/1.73
GLOBULIN SER CALC-MCNC: 2 GM/DL
GLUCOSE SERPL-MCNC: 137 MG/DL (ref 65–99)
HDLC SERPL-MCNC: 45 MG/DL (ref 40–60)
IMP & REVIEW OF LAB RESULTS: NORMAL
LDLC SERPL CALC-MCNC: 72 MG/DL (ref 0–100)
POTASSIUM SERPL-SCNC: 4.3 MMOL/L (ref 3.5–5.2)
PROT SERPL-MCNC: 6.8 G/DL (ref 6–8.5)
SODIUM SERPL-SCNC: 143 MMOL/L (ref 136–145)
T4 FREE SERPL-MCNC: 0.99 NG/DL (ref 0.93–1.7)
THYROPEROXIDASE AB SERPL-ACNC: >600 IU/ML (ref 0–34)
TRIGL SERPL-MCNC: 89 MG/DL (ref 0–150)
TSH SERPL DL<=0.005 MIU/L-ACNC: 8.1 UIU/ML (ref 0.27–4.2)
VLDLC SERPL CALC-MCNC: 17 MG/DL (ref 5–40)

## 2023-05-18 ENCOUNTER — TELEPHONE (OUTPATIENT)
Dept: FAMILY MEDICINE CLINIC | Facility: CLINIC | Age: 43
End: 2023-05-18
Payer: COMMERCIAL

## 2023-05-21 DIAGNOSIS — E03.9 PRIMARY HYPOTHYROIDISM: Primary | ICD-10-CM

## 2023-05-21 RX ORDER — LEVOTHYROXINE SODIUM 0.07 MG/1
TABLET ORAL
Qty: 90 TABLET | Refills: 1 | Status: SHIPPED | OUTPATIENT
Start: 2023-05-21

## 2023-05-21 NOTE — PROGRESS NOTES
Thyroid peroxidase antibody elevated.  TSH elevated at 8.1.  Normal free T4 at 0.99 ng per DL.  Thyroid levels low.  Start levothyroxine 75 mcg/day.  Prescription sent to pharmacy.  Repeat free T4 and TSH in 2 months.  Orders placed on chart.  Fasting glucose 137 mg per DL.  LDL 73.  HDL 45.  Triglycerides 89.  Continue Crestor 5 mg/day.  Copy of labs sent to Dr. Cox.  Please notify patient results and instructions.

## 2023-05-23 RX ORDER — PROCHLORPERAZINE 25 MG/1
SUPPOSITORY RECTAL
Qty: 9 EACH | Refills: 3 | Status: SHIPPED | OUTPATIENT
Start: 2023-05-23

## 2023-05-23 RX ORDER — INSULIN LISPRO 200 [IU]/ML
INJECTION, SOLUTION SUBCUTANEOUS
Qty: 12 ML | Refills: 4 | Status: SHIPPED | OUTPATIENT
Start: 2023-05-23

## 2023-05-23 NOTE — TELEPHONE ENCOUNTER
Rx Refill Note  Requested Prescriptions     Pending Prescriptions Disp Refills   • HumaLOG KwikPen 200 UNIT/ML solution pen-injector [Pharmacy Med Name: HUMALOG KWIKPEN 3ML 2'S 200U/ML] 12 mL 4     Sig: USE AS INSTRUCTED BY YOUR PRESCRIBER      Last office visit with prescribing clinician: 3/10/2023   Last telemedicine visit with prescribing clinician: Visit date not found   Next office visit with prescribing clinician: 9/18/2023                         Would you like a call back once the refill request has been completed: [] Yes [] No    If the office needs to give you a call back, can they leave a voicemail: [] Yes [] No    Sandra Alonso MA  05/23/23, 15:37 EDT

## 2023-05-23 NOTE — TELEPHONE ENCOUNTER
Rx Refill Note  Requested Prescriptions     Pending Prescriptions Disp Refills   • Continuous Blood Gluc Sensor (Dexcom G6 Sensor) [Pharmacy Med Name: DEXCOM G6 SENSOR 3'S] 9 each 3     Sig: APPLY TOPICALLY EVERY 10 DAYS      Last office visit with prescribing clinician: Visit date not found   Last telemedicine visit with prescribing clinician: Visit date not found   Next office visit with prescribing clinician: Visit date not found                         Would you like a call back once the refill request has been completed: [] Yes [] No    If the office needs to give you a call back, can they leave a voicemail: [] Yes [] No    Sandra Alonso MA  05/23/23, 15:36 EDT

## 2023-06-05 ENCOUNTER — OFFICE VISIT (OUTPATIENT)
Dept: FAMILY MEDICINE CLINIC | Facility: CLINIC | Age: 43
End: 2023-06-05
Payer: COMMERCIAL

## 2023-06-05 VITALS
SYSTOLIC BLOOD PRESSURE: 118 MMHG | HEIGHT: 74 IN | RESPIRATION RATE: 16 BRPM | DIASTOLIC BLOOD PRESSURE: 76 MMHG | WEIGHT: 245 LBS | OXYGEN SATURATION: 96 % | BODY MASS INDEX: 31.44 KG/M2 | HEART RATE: 69 BPM

## 2023-06-05 DIAGNOSIS — M62.81 MUSCLE WEAKNESS (GENERALIZED): ICD-10-CM

## 2023-06-05 DIAGNOSIS — E06.3 AUTOIMMUNE HYPOTHYROIDISM: Primary | ICD-10-CM

## 2023-06-05 DIAGNOSIS — E78.5 HYPERLIPIDEMIA, UNSPECIFIED HYPERLIPIDEMIA TYPE: ICD-10-CM

## 2023-06-05 PROCEDURE — 99203 OFFICE O/P NEW LOW 30 MIN: CPT | Performed by: INTERNAL MEDICINE

## 2023-06-05 NOTE — PROGRESS NOTES
Subjective chief complaint is checkup on thyroid and cholesterol  Erasmo Kelley is a 43 y.o. male.     History of Present Illness Rohit is here today for checkup on his thyroid and cholesterol.  Is been a number of years since I have seen him.  He has been seeing an endocrinologist because of his diabetes.  Recent thyroid labs started to show a rising TSH and lowering of his free T4.  Thyroid antibodies were checked and they were quite high.  He also has been having some worsening cholesterol levels.  He was started on some rosuvastatin which did seem to help lower his cholesterol.  However he experienced significant muscle weakness and fatigue with this.  We did discuss that if the thyroid is not fully optimized then cholesterol medicines can sometimes bother the muscles more.  We did discuss staying off of the cholesterol medicine until his thyroid is fully optimized.  We may need to consider a different statin such as PET-avid statin if we need something.  We did discuss rechecking his cholesterol about 3 months after his thyroid is fully optimized.  We did discuss that because of his diabetes that cholesterol medicine form of statins is highly recommended.    The following portions of the patient's history were reviewed and updated as appropriate: allergies, current medications, past family history, past medical history, past social history, past surgical history, and problem list.    Review of Systems   Constitutional:  Negative for chills and fatigue.   Musculoskeletal:  Positive for myalgias.   Neurological:  Positive for weakness.     Objective   Physical Exam  Constitutional:       Appearance: Normal appearance.   Neck:      Thyroid: No thyroid mass or thyromegaly.      Vascular: No carotid bruit.   Cardiovascular:      Rate and Rhythm: Normal rate and regular rhythm.   Pulmonary:      Effort: Pulmonary effort is normal.      Breath sounds: No wheezing, rhonchi or rales.   Abdominal:      General: Bowel  sounds are normal.      Palpations: Abdomen is soft.      Tenderness: There is no abdominal tenderness. There is no guarding or rebound.   Musculoskeletal:      Right lower leg: No edema.      Left lower leg: No edema.   Neurological:      Mental Status: He is alert.       Assessment & Plan   Diagnoses and all orders for this visit:    1. Autoimmune hypothyroidism (Primary)    2. Hyperlipidemia, unspecified hyperlipidemia type    3. Muscle weakness (generalized)    Rohit is here today for establishment of care.  He has some questions regarding his cholesterol and thyroid.  I do think he will need to have his thyroid replaced based on the thyroid antibodies and declining free T4 along with the rising TSH.  I did advise that getting that fully optimized before retrying a statin may be a better option.

## 2023-06-09 ENCOUNTER — PATIENT ROUNDING (BHMG ONLY) (OUTPATIENT)
Dept: FAMILY MEDICINE CLINIC | Facility: CLINIC | Age: 43
End: 2023-06-09
Payer: COMMERCIAL

## 2023-06-09 NOTE — PROGRESS NOTES
A [employer] message has been sent to patient rounding with [facility] [place].

## 2023-09-04 DIAGNOSIS — E78.49 OTHER HYPERLIPIDEMIA: Primary | ICD-10-CM

## 2023-09-05 RX ORDER — ATORVASTATIN CALCIUM 20 MG/1
20 TABLET, FILM COATED ORAL DAILY
Qty: 30 TABLET | Refills: 5 | OUTPATIENT
Start: 2023-09-05 | End: 2024-09-04

## 2023-09-05 RX ORDER — ROSUVASTATIN CALCIUM 5 MG/1
TABLET, COATED ORAL
Qty: 30 TABLET | Refills: 5 | Status: SHIPPED | OUTPATIENT
Start: 2023-09-05

## 2023-09-18 ENCOUNTER — OFFICE VISIT (OUTPATIENT)
Dept: ENDOCRINOLOGY | Age: 43
End: 2023-09-18
Payer: COMMERCIAL

## 2023-09-18 VITALS
DIASTOLIC BLOOD PRESSURE: 76 MMHG | SYSTOLIC BLOOD PRESSURE: 108 MMHG | HEART RATE: 83 BPM | BODY MASS INDEX: 31.29 KG/M2 | TEMPERATURE: 96.6 F | OXYGEN SATURATION: 97 % | WEIGHT: 243.8 LBS | HEIGHT: 74 IN

## 2023-09-18 DIAGNOSIS — E03.9 PRIMARY HYPOTHYROIDISM: ICD-10-CM

## 2023-09-18 DIAGNOSIS — E10.9 TYPE 1 DIABETES MELLITUS WITHOUT COMPLICATION: Primary | ICD-10-CM

## 2023-09-18 DIAGNOSIS — E78.49 OTHER HYPERLIPIDEMIA: ICD-10-CM

## 2023-09-18 PROCEDURE — 99214 OFFICE O/P EST MOD 30 MIN: CPT | Performed by: INTERNAL MEDICINE

## 2023-09-18 RX ORDER — LEVOTHYROXINE SODIUM 0.1 MG/1
TABLET ORAL
Qty: 30 TABLET | Refills: 5 | Status: SHIPPED | OUTPATIENT
Start: 2023-09-18

## 2023-09-18 NOTE — PROGRESS NOTES
Subjective   Erasmo Kelley is a 43 y.o. male.     History of Present Illness     He was diagnosed to have diabetes mellitus in February 2018 with the 5-8 month history of increased thirst, polyuria, and weight loss of 35 pounds.  DOMINGO antibody was elevated at 689 units per mL.       He has lost 6 lbs since 3/23.  He exercise regularly.     He is on Tresiba 28 units every AM and Insulin lispro 6-12 TID with meals.  He has seen the diabetic educator.  He is using a Dexcom 6 sensor.  He has rare hypoglycemic episode.  His last meal was 10 AM.     Sensor data reviewed.  Average glucose 171 mg per DL.  Time in range 55%.  Time above range 44%.  Time below range less than 1 4%.  GMI 7.4%.  Fasting glucose are 80 or higher.  He has intermittent postprandial hyperglycemia after lunch and mostly after supper.    His last eye examination was in 12/22.  He has no retinopathy..   He denies any numbness, tingling or burning sensation in his hands or feet.  Urine microalbumin was normal in 11/21.     He has no history of hypertension.        He has hyperlipidemia and is off Crestor 5 mg/day for 3 months because he had arthralgia of knee and myalgia while on it.    He has primary hypothyroidism with elevated thyroid peroxidase antibody.  He is on levothyroxine 75 mcg/day.  TSH done in September 2023 is mildly elevated at 4.87.     He does not smoke cigarettes.  He drinks 12 beers per week depending on social occasion.  He denies alcohol-related disease.  He denies drug abuse.       His daughter was recently diagnosed to have type 1 diabetes mellitus and is using a 1 unit per 14 g carbohydrate ratio.      The following portions of the patient's history were reviewed and updated as appropriate: allergies, current medications, past family history, past medical history, past social history, past surgical history, and problem list.    Review of Systems   Eyes:  Negative for visual disturbance.   Respiratory:  Negative for shortness  "of breath.    Cardiovascular:  Negative for chest pain and palpitations.   Gastrointestinal: Negative.    Genitourinary: Negative.    Musculoskeletal:  Negative for arthralgias and myalgias.   Neurological:  Negative for numbness.   Vitals:    09/18/23 1034   BP: 108/76   Pulse: 83   Temp: 96.6 °F (35.9 °C)   TempSrc: Temporal   SpO2: 97%   Weight: 111 kg (243 lb 12.8 oz)   Height: 188 cm (74.02\")      Objective   Physical Exam  Constitutional:       General: He is not in acute distress.     Appearance: Normal appearance. He is not ill-appearing, toxic-appearing or diaphoretic.   Eyes:      General: No scleral icterus.        Right eye: No discharge.         Left eye: No discharge.      Extraocular Movements: Extraocular movements intact.      Conjunctiva/sclera: Conjunctivae normal.   Neck:      Vascular: No carotid bruit.   Cardiovascular:      Rate and Rhythm: Normal rate and regular rhythm.      Heart sounds: Normal heart sounds. No murmur heard.    No friction rub.   Pulmonary:      Breath sounds: Normal breath sounds. No rales.   Chest:      Chest wall: No tenderness.   Abdominal:      General: Bowel sounds are normal.      Palpations: Abdomen is soft.      Tenderness: There is no right CVA tenderness or left CVA tenderness.   Genitourinary:     Rectum: Guaiac result negative.   Musculoskeletal:      Right lower leg: No edema.      Left lower leg: No edema.   Lymphadenopathy:      Cervical: No cervical adenopathy.   Skin:     General: Skin is warm.   Neurological:      Mental Status: He is alert and oriented to person, place, and time.      Comments: Intact light touch on lower ext   Psychiatric:         Mood and Affect: Mood normal.         Behavior: Behavior normal.     Results Encounter on 08/21/2023   Component Date Value Ref Range Status    Free T4 09/11/2023 1.19  0.93 - 1.70 ng/dL Final    Results may be falsely increased if patient taking Biotin.    TSH 09/11/2023 4.870 (H)  0.270 - 4.200 uIU/mL Final "     Assessment & Plan   Diagnoses and all orders for this visit:    1. Type 1 diabetes mellitus without complication (Primary)  -     Comprehensive Metabolic Panel; Future  -     Hemoglobin A1c; Future  -     Celiac Disease Panel; Future    2. Primary hypothyroidism  -     TSH; Future  -     T4, Free; Future  -     levothyroxine (SYNTHROID, LEVOTHROID) 100 MCG tablet; By mouth take 1 tab daily in AM as directed on empty stomach with water only.  Dispense: 30 tablet; Refill: 5    3. Other hyperlipidemia  -     Comprehensive Metabolic Panel; Future  -     Lipid Panel; Future      Continue Tresiba 28 units every morning and mealtime lispro.  May try an insulin to carb ratio 1:14.  Increase levothyroxine to 100 mcg/day.  Repeat thyroid function tests, lipid panel, and hemoglobin A1c in 6 weeks.  Flu vaccine this fall.    Copy of my note sent to Dr. Cox.    RTC 4 mos.

## 2023-10-30 ENCOUNTER — LAB (OUTPATIENT)
Dept: ENDOCRINOLOGY | Age: 43
End: 2023-10-30
Payer: COMMERCIAL

## 2023-10-30 DIAGNOSIS — E03.9 PRIMARY HYPOTHYROIDISM: ICD-10-CM

## 2023-10-30 DIAGNOSIS — E78.49 OTHER HYPERLIPIDEMIA: ICD-10-CM

## 2023-10-30 DIAGNOSIS — E10.9 TYPE 1 DIABETES MELLITUS WITHOUT COMPLICATION: ICD-10-CM

## 2023-10-31 LAB
ALBUMIN SERPL-MCNC: 4.6 G/DL (ref 4.1–5.1)
ALBUMIN/GLOB SERPL: 2.2 {RATIO} (ref 1.2–2.2)
ALP SERPL-CCNC: 49 IU/L (ref 44–121)
ALT SERPL-CCNC: 37 IU/L (ref 0–44)
AST SERPL-CCNC: 24 IU/L (ref 0–40)
BILIRUB SERPL-MCNC: 1.9 MG/DL (ref 0–1.2)
BUN SERPL-MCNC: 14 MG/DL (ref 6–24)
BUN/CREAT SERPL: 13 (ref 9–20)
CALCIUM SERPL-MCNC: 9.4 MG/DL (ref 8.7–10.2)
CHLORIDE SERPL-SCNC: 103 MMOL/L (ref 96–106)
CHOLEST SERPL-MCNC: 212 MG/DL (ref 100–199)
CO2 SERPL-SCNC: 25 MMOL/L (ref 20–29)
CREAT SERPL-MCNC: 1.08 MG/DL (ref 0.76–1.27)
EGFRCR SERPLBLD CKD-EPI 2021: 88 ML/MIN/1.73
ENDOMYSIUM IGA SER QL: NEGATIVE
GLOBULIN SER CALC-MCNC: 2.1 G/DL (ref 1.5–4.5)
GLUCOSE SERPL-MCNC: 154 MG/DL (ref 70–99)
HBA1C MFR BLD: 7.1 % (ref 4.8–5.6)
HDLC SERPL-MCNC: 47 MG/DL
IGA SERPL-MCNC: 157 MG/DL (ref 90–386)
IMP & REVIEW OF LAB RESULTS: NORMAL
LDLC SERPL CALC-MCNC: 138 MG/DL (ref 0–99)
POTASSIUM SERPL-SCNC: 4.4 MMOL/L (ref 3.5–5.2)
PROT SERPL-MCNC: 6.7 G/DL (ref 6–8.5)
SODIUM SERPL-SCNC: 140 MMOL/L (ref 134–144)
T4 FREE SERPL-MCNC: 1.37 NG/DL (ref 0.82–1.77)
TRIGL SERPL-MCNC: 148 MG/DL (ref 0–149)
TSH SERPL DL<=0.005 MIU/L-ACNC: 3.85 UIU/ML (ref 0.45–4.5)
TTG IGA SER-ACNC: <2 U/ML (ref 0–3)
VLDLC SERPL CALC-MCNC: 27 MG/DL (ref 5–40)

## 2023-11-01 DIAGNOSIS — E10.9 TYPE 1 DIABETES MELLITUS WITHOUT COMPLICATION: Primary | ICD-10-CM

## 2023-11-01 RX ORDER — PRAVASTATIN SODIUM 10 MG
10 TABLET ORAL NIGHTLY
Qty: 30 TABLET | Refills: 3 | Status: SHIPPED | OUTPATIENT
Start: 2023-11-01 | End: 2024-10-31

## 2023-11-02 ENCOUNTER — TELEPHONE (OUTPATIENT)
Dept: ENDOCRINOLOGY | Age: 43
End: 2023-11-02
Payer: COMMERCIAL

## 2023-11-02 RX ORDER — PROCHLORPERAZINE 25 MG/1
1 SUPPOSITORY RECTAL
Qty: 1 EACH | Refills: 3 | Status: SHIPPED | OUTPATIENT
Start: 2023-11-02

## 2023-11-02 NOTE — PROGRESS NOTES
Hemoglobin A1c slightly higher at 7.1%.  LDL higher at 138.  HDL 47.  Triglycerides 148.  Cholesterol 212.  Patient has been off Crestor 5 mg/day.  Suggest starting pravastatin 10 mg every evening.  Prescription sent to pharmacy.  Normal free T4.  Normal TSH.  Continue levothyroxine 100 mcg/day.  Normal celiac panel.  Please notify patient of results and instructions.  Copy of labs sent to Dr. Cox

## 2023-11-02 NOTE — TELEPHONE ENCOUNTER
11/2 called and lm for pt to call reg his labs   Ok for hub to read to patient   Please schedule labs if needed or follow ups  Ok for  to read to patient   Please schedule labs if needed or follow ups       ---- Message from Rusty Tomlinson MD sent at 11/1/2023  9:30 PM EDT -----  Hemoglobin A1c slightly higher at 7.1%.  LDL higher at 138.  HDL 47.  Triglycerides 148.  Cholesterol 212.  Patient has been off Crestor 5 mg/day.  Suggest starting pravastatin 10 mg every evening.  Prescription sent to pharmacy.  Normal free T4.  Normal TSH.  Continue levothyroxine 100 mcg/day.  Normal celiac panel.

## 2023-11-14 DIAGNOSIS — E10.9 TYPE 1 DIABETES MELLITUS WITHOUT COMPLICATION: Primary | ICD-10-CM

## 2023-11-14 RX ORDER — INSULIN LISPRO 200 [IU]/ML
INJECTION, SOLUTION SUBCUTANEOUS TAKE AS DIRECTED
Qty: 12 ML | Refills: 4 | Status: CANCELLED | OUTPATIENT
Start: 2023-11-14

## 2023-11-14 RX ORDER — INSULIN LISPRO 200 [IU]/ML
5-15 INJECTION, SOLUTION SUBCUTANEOUS
Qty: 27 ML | Refills: 2 | Status: SHIPPED | OUTPATIENT
Start: 2023-11-14

## 2023-11-15 DIAGNOSIS — E03.9 PRIMARY HYPOTHYROIDISM: ICD-10-CM

## 2023-11-15 RX ORDER — LEVOTHYROXINE SODIUM 0.1 MG/1
100 TABLET ORAL DAILY
Qty: 90 TABLET | Refills: 2 | Status: SHIPPED | OUTPATIENT
Start: 2023-11-15 | End: 2023-11-17 | Stop reason: SDUPTHER

## 2023-11-17 DIAGNOSIS — E03.9 PRIMARY HYPOTHYROIDISM: ICD-10-CM

## 2023-11-17 RX ORDER — LEVOTHYROXINE SODIUM 0.1 MG/1
100 TABLET ORAL DAILY
Qty: 90 TABLET | Refills: 2 | Status: SHIPPED | OUTPATIENT
Start: 2023-11-17

## 2023-11-21 RX ORDER — INSULIN LISPRO 200 [IU]/ML
5-15 INJECTION, SOLUTION SUBCUTANEOUS
Qty: 21 ML | Refills: 0 | Status: SHIPPED | OUTPATIENT
Start: 2023-11-21

## 2024-01-11 RX ORDER — PROCHLORPERAZINE 25 MG/1
SUPPOSITORY RECTAL
Qty: 9 EACH | Refills: 3 | Status: SHIPPED | OUTPATIENT
Start: 2024-01-11

## 2024-01-11 NOTE — TELEPHONE ENCOUNTER
Incoming Refill Request      Medication requested (name and dose): DEXCOM G6 SENSOR     Pharmacy where request should be sent: [facility]     Additional details provided by patient:     Best call back number: [phone]     Does the patient have less than a 3 day supply:  [x] Yes  [] No    Kenney Barone Rep  01/11/24, 14:05 EST

## 2024-01-25 ENCOUNTER — OFFICE VISIT (OUTPATIENT)
Dept: ENDOCRINOLOGY | Age: 44
End: 2024-01-25
Payer: COMMERCIAL

## 2024-01-25 VITALS
OXYGEN SATURATION: 98 % | WEIGHT: 248 LBS | DIASTOLIC BLOOD PRESSURE: 90 MMHG | HEART RATE: 67 BPM | SYSTOLIC BLOOD PRESSURE: 120 MMHG | TEMPERATURE: 96.6 F | BODY MASS INDEX: 31.83 KG/M2

## 2024-01-25 DIAGNOSIS — E78.49 OTHER HYPERLIPIDEMIA: ICD-10-CM

## 2024-01-25 DIAGNOSIS — E10.9 TYPE 1 DIABETES MELLITUS WITHOUT COMPLICATION: Primary | ICD-10-CM

## 2024-01-25 RX ORDER — INSULIN LISPRO 200 [IU]/ML
14-22 INJECTION, SOLUTION SUBCUTANEOUS
Qty: 36 ML | Refills: 2 | Status: SHIPPED | OUTPATIENT
Start: 2024-01-25

## 2024-01-25 NOTE — PROGRESS NOTES
Subjective   Erasmo Kelley is a 43 y.o. male.     History of Present Illness     He was diagnosed to have diabetes mellitus in February 2018 with the 5-8 month history of increased thirst, polyuria, and weight loss of 35 pounds.  DOMINGO antibody was elevated at 689 units per mL.       He has gained 5 lbs since 9/23.  He exercise regularly.     He is on Tresiba 28 units every AM and Insulin lispro 1 unit per 14 gram CHO (14-18 units) with meals.  He has seen the diabetic educator.  He is using a Dexcom 6 sensor.  He has rare hypoglycemic episode.  His last meal was 10 AM.    Sensor data January 13, 2024 to January 26, 2024 reviewed.  Average glucose 184 mg per DL.  GMI 7.7%.  Time in range 53%.  Time above range 46%.  Time below range less than 1%.  He has no early morning hypoglycemia.  Fasting glucoses 100 or higher.  He has intermittent postprandial hyperglycemia mostly after breakfast and after supper.  He has occasional hypoglycemia in the afternoon.     His last eye examination was in 2023.  He has no retinopathy..   He denies any numbness, tingling or burning sensation in his hands or feet.  Urine microalbumin was normal in 11/21.     He has no history of hypertension.        He has hyperlipidemia and is on pravastatin 10 mg every PM.  He was taken off Crestor 5 mg/day because he had arthralgia of knee and myalgia while on it.     He has primary hypothyroidism with elevated thyroid peroxidase antibody.  He is on levothyroxine 100 mcg/day.      He does not smoke cigarettes.  He drinking less beer.  He denies drug abuse.       His daughter has type 1 diabetes mellitus.    The following portions of the patient's history were reviewed and updated as appropriate: allergies, current medications, past family history, past medical history, past social history, past surgical history, and problem list.    Review of Systems   Eyes:  Negative for visual disturbance.   Respiratory:  Negative for shortness of breath and  wheezing.    Cardiovascular:  Negative for chest pain and palpitations.   Gastrointestinal: Negative.    Genitourinary: Negative.    Musculoskeletal:  Negative for myalgias.   Neurological:  Negative for numbness.   Hematological:  Negative for adenopathy. Does not bruise/bleed easily.     Vitals:    01/25/24 0827   BP: 120/90   Pulse: 67   Temp: 96.6 °F (35.9 °C)   TempSrc: Temporal   SpO2: 98%   Weight: 112 kg (248 lb)      Objective   Physical Exam  Constitutional:       General: He is not in acute distress.     Appearance: Normal appearance. He is obese. He is not ill-appearing, toxic-appearing or diaphoretic.   Eyes:      General: No scleral icterus.        Right eye: No discharge.         Left eye: No discharge.      Extraocular Movements: Extraocular movements intact.      Conjunctiva/sclera: Conjunctivae normal.   Neck:      Vascular: No carotid bruit.   Cardiovascular:      Rate and Rhythm: Normal rate and regular rhythm.      Heart sounds: Normal heart sounds. No murmur heard.     No friction rub.   Pulmonary:      Effort: No respiratory distress.      Breath sounds: Normal breath sounds. No stridor. No rales.   Chest:      Chest wall: No tenderness.   Abdominal:      General: Bowel sounds are normal.      Palpations: Abdomen is soft.      Tenderness: There is no right CVA tenderness or left CVA tenderness.   Musculoskeletal:      Right lower leg: No edema.      Left lower leg: No edema.      Comments: Feet warm.  No cyanosis.  Palpable dorsalis pedis pulses.  No plantar ulcers.   Lymphadenopathy:      Cervical: No cervical adenopathy.   Neurological:      Mental Status: He is alert and oriented to person, place, and time.      Comments: Intact light touch in lower extremities.     Lab on 10/30/2023   Component Date Value Ref Range Status    Endomysial IgA 10/30/2023 Negative  Negative Final    Tissue Transglutaminase IgA 10/30/2023 <2  0 - 3 U/mL Final    Comment:                               Negative         0 -  3                                Weak Positive   4 - 10                                Positive           >10   Tissue Transglutaminase (tTG) has been identified   as the endomysial antigen.  Studies have demonstr-   ated that endomysial IgA antibodies have over 99%   specificity for gluten sensitive enteropathy.      IgA 10/30/2023 157  90 - 386 mg/dL Final    Free T4 10/30/2023 1.37  0.82 - 1.77 ng/dL Final    TSH 10/30/2023 3.850  0.450 - 4.500 uIU/mL Final    Hemoglobin A1C 10/30/2023 7.1 (H)  4.8 - 5.6 % Final    Comment:          Prediabetes: 5.7 - 6.4           Diabetes: >6.4           Glycemic control for adults with diabetes: <7.0      Total Cholesterol 10/30/2023 212 (H)  100 - 199 mg/dL Final    Triglycerides 10/30/2023 148  0 - 149 mg/dL Final    HDL Cholesterol 10/30/2023 47  >39 mg/dL Final    VLDL Cholesterol Henok 10/30/2023 27  5 - 40 mg/dL Final    LDL Chol Calc (NIH) 10/30/2023 138 (H)  0 - 99 mg/dL Final    Glucose 10/30/2023 154 (H)  70 - 99 mg/dL Final    BUN 10/30/2023 14  6 - 24 mg/dL Final    Creatinine 10/30/2023 1.08  0.76 - 1.27 mg/dL Final    EGFR Result 10/30/2023 88  >59 mL/min/1.73 Final    BUN/Creatinine Ratio 10/30/2023 13  9 - 20 Final    Sodium 10/30/2023 140  134 - 144 mmol/L Final    Potassium 10/30/2023 4.4  3.5 - 5.2 mmol/L Final    Chloride 10/30/2023 103  96 - 106 mmol/L Final    Total CO2 10/30/2023 25  20 - 29 mmol/L Final    Calcium 10/30/2023 9.4  8.7 - 10.2 mg/dL Final    Total Protein 10/30/2023 6.7  6.0 - 8.5 g/dL Final    Albumin 10/30/2023 4.6  4.1 - 5.1 g/dL Final    Globulin 10/30/2023 2.1  1.5 - 4.5 g/dL Final    A/G Ratio 10/30/2023 2.2  1.2 - 2.2 Final    Total Bilirubin 10/30/2023 1.9 (H)  0.0 - 1.2 mg/dL Final    Alkaline Phosphatase 10/30/2023 49  44 - 121 IU/L Final    AST (SGOT) 10/30/2023 24  0 - 40 IU/L Final    ALT (SGPT) 10/30/2023 37  0 - 44 IU/L Final    Interpretation 10/30/2023 Note   Final    Supplemental report is available.     Assessment  & Plan   Diagnoses and all orders for this visit:    1. Type 1 diabetes mellitus without complication (Primary)  -     Comprehensive Metabolic Panel  -     Hemoglobin A1c  -     TSH  -     Microalbumin / Creatinine Urine Ratio - Urine, Clean Catch    2. Other hyperlipidemia  -     Comprehensive Metabolic Panel  -     Lipid Panel  -     TSH      Continue Tresiba 28 units every morning.  Continue mealtime Humalog.    Continue pravastatin 10 mg/day.    Continue levothyroxine 100 mcg/day.    Copy of my note sent to Dr. Cox.    RTC 4 mos.

## 2024-01-25 NOTE — Clinical Note
Sensor data January 13, 2024 to January 26, 2024 reviewed. Average glucose 184 mg per DL. GMI 7.7%. Time in range 53%. Time above range 46%. Time below range less than 1%. He has no early morning hypoglycemia.  Fasting glucoses 100 or higher.  He has intermittent postprandial hyperglycemia mostly after breakfast and after supper.  He has occasional hypoglycemia in the afternoon.  Continue Tresiba 28 units every morning. Continue mealtime Humalog. Stay on diet. Please notify patient of results

## 2024-01-26 LAB
ALBUMIN SERPL-MCNC: 4.9 G/DL (ref 3.5–5.2)
ALBUMIN/GLOB SERPL: 2.5 G/DL
ALP SERPL-CCNC: 50 U/L (ref 39–117)
ALT SERPL-CCNC: 48 U/L (ref 1–41)
AST SERPL-CCNC: 26 U/L (ref 1–40)
BILIRUB SERPL-MCNC: 1.9 MG/DL (ref 0–1.2)
BUN SERPL-MCNC: 18 MG/DL (ref 6–20)
BUN/CREAT SERPL: 16.4 (ref 7–25)
CALCIUM SERPL-MCNC: 9.8 MG/DL (ref 8.6–10.5)
CHLORIDE SERPL-SCNC: 104 MMOL/L (ref 98–107)
CHOLEST SERPL-MCNC: 221 MG/DL (ref 0–200)
CO2 SERPL-SCNC: 26.2 MMOL/L (ref 22–29)
CREAT SERPL-MCNC: 1.1 MG/DL (ref 0.76–1.27)
EGFRCR SERPLBLD CKD-EPI 2021: 86.5 ML/MIN/1.73
GLOBULIN SER CALC-MCNC: 2 GM/DL
GLUCOSE SERPL-MCNC: 118 MG/DL (ref 65–99)
HBA1C MFR BLD: 7.4 % (ref 4.8–5.6)
HDLC SERPL-MCNC: 48 MG/DL (ref 40–60)
IMP & REVIEW OF LAB RESULTS: NORMAL
LDLC SERPL CALC-MCNC: 152 MG/DL (ref 0–100)
POTASSIUM SERPL-SCNC: 4.5 MMOL/L (ref 3.5–5.2)
PROT SERPL-MCNC: 6.9 G/DL (ref 6–8.5)
SODIUM SERPL-SCNC: 141 MMOL/L (ref 136–145)
TRIGL SERPL-MCNC: 118 MG/DL (ref 0–150)
TSH SERPL DL<=0.005 MIU/L-ACNC: 3.33 UIU/ML (ref 0.27–4.2)
UNABLE TO VOID: NORMAL
VLDLC SERPL CALC-MCNC: 21 MG/DL (ref 5–40)

## 2024-01-28 RX ORDER — PRAVASTATIN SODIUM 20 MG
20 TABLET ORAL NIGHTLY
Qty: 30 TABLET | Refills: 5 | Status: SHIPPED | OUTPATIENT
Start: 2024-01-28

## 2024-01-28 NOTE — PROGRESS NOTES
LDL higher at 152.  HDL 48.  Triglycerides 118.  Increase pravastatin to 20 mg every evening.  Prescription sent to pharmacy.  May take 2 tablets of pravastatin 10 mg until he runs out of it.  Hemoglobin A1c higher at 7.4%.  Normal thyroid function tests.  Continue levothyroxine 100 mcg a day.  Copy of labs sent to  and to patient through [employer].

## 2024-02-26 RX ORDER — ATORVASTATIN CALCIUM 20 MG/1
20 TABLET, FILM COATED ORAL DAILY
Qty: 30 TABLET | Refills: 5 | OUTPATIENT
Start: 2024-02-26 | End: 2025-02-25

## 2024-02-26 NOTE — TELEPHONE ENCOUNTER
Rx Refill Note  Requested Prescriptions     Pending Prescriptions Disp Refills    atorvastatin (LIPITOR) 20 MG tablet [Pharmacy Med Name: ATORVASTATIN 20MG TABLETS] 30 tablet 5     Sig: TAKE 1 TABLET BY MOUTH DAILY      Last office visit with prescribing clinician: 1/25/2024   Last telemedicine visit with prescribing clinician: Visit date not found   Next office visit with prescribing clinician: 6/6/2024                         Would you like a call back once the refill request has been completed: [] Yes [] No    If the office needs to give you a call back, can they leave a voicemail: [] Yes [] No    Elizabeth Hooks  02/26/24, 12:22 EST

## 2024-03-18 DIAGNOSIS — E10.9 TYPE 1 DIABETES MELLITUS WITHOUT COMPLICATION: ICD-10-CM

## 2024-03-19 RX ORDER — INSULIN DEGLUDEC INJECTION 100 U/ML
INJECTION, SOLUTION SUBCUTANEOUS
Qty: 27 ML | Refills: 2 | Status: SHIPPED | OUTPATIENT
Start: 2024-03-19

## 2024-03-19 NOTE — TELEPHONE ENCOUNTER
Rx Refill Note  Requested Prescriptions     Pending Prescriptions Disp Refills    Tresiba FlexTouch 100 UNIT/ML solution pen-injector injection [Pharmacy Med Name: TRESIBA FLEXTOUCH PEN 3ML 5'S 100U/ML] 30 mL 3     Sig: INJECT 28 UNITS UNDER THE SKIN EVERY MORNING INTO THE APPROPRIATE AREA DAILY AS DIRECTED FOR INSULIN DEPENDENT DIABETES      Last office visit with prescribing clinician: 1/25/2024   Last telemedicine visit with prescribing clinician: Visit date not found   Next office visit with prescribing clinician: 6/6/2024                         Would you like a call back once the refill request has been completed: [] Yes [] No    If the office needs to give you a call back, can they leave a voicemail: [] Yes [] No    Kaelyn Hooks MA  03/19/24, 07:47 EDT

## 2024-06-06 ENCOUNTER — OFFICE VISIT (OUTPATIENT)
Dept: ENDOCRINOLOGY | Age: 44
End: 2024-06-06
Payer: COMMERCIAL

## 2024-06-06 VITALS
BODY MASS INDEX: 32.11 KG/M2 | WEIGHT: 250.2 LBS | DIASTOLIC BLOOD PRESSURE: 84 MMHG | HEART RATE: 71 BPM | HEIGHT: 74 IN | OXYGEN SATURATION: 96 % | TEMPERATURE: 96.9 F | SYSTOLIC BLOOD PRESSURE: 142 MMHG

## 2024-06-06 DIAGNOSIS — E78.49 OTHER HYPERLIPIDEMIA: ICD-10-CM

## 2024-06-06 DIAGNOSIS — E10.9 TYPE 1 DIABETES MELLITUS WITHOUT COMPLICATION: Primary | ICD-10-CM

## 2024-06-06 DIAGNOSIS — E03.9 PRIMARY HYPOTHYROIDISM: ICD-10-CM

## 2024-06-06 NOTE — PROGRESS NOTES
Subjective   Erasmo Kelley is a 44 y.o. male.     History of Present Illness     He was diagnosed to have diabetes mellitus in February 2018 with the 5-8 month history of increased thirst, polyuria, and weight loss of 35 pounds.  DOMINGO antibody was elevated at 689 units per mL.       He has gained 2 lbs since 1/24.  He exercise regularly.     He is on Tresiba 28 units every AM and Insulin lispro 1 unit per 14 gram CHO (14-18 units) with meals.  He has seen the diabetic educator.  He is using a Dexcom 6 sensor.  He has rare hypoglycemic episode.  His last meal was 2 AM.    He is considering using a pump this fall.     Sensor data from May 24, 2024 through June 6, 2024 reviewed.  Average glucose 173 mg per DL.  GMI 7.4%.  Time in range 56%.  Time above range 42%.  Time below range 1%.  He has intermittent hypoglycemia with overcorrection.  He has postprandial hyperglycemia mostly after supper.    His last eye examination was in 2023.  He has no retinopathy..   He denies any numbness, tingling or burning sensation in his hands or feet.  Urine microalbumin was normal in 11/21.     He has no history of hypertension.        He has hyperlipidemia and is on pravastatin 20 mg every PM.  He was taken off Crestor 5 mg/day because he had arthralgia of knee and myalgia while on it.     He has primary hypothyroidism with elevated thyroid peroxidase antibody.  He is on levothyroxine 100 mcg/day.      He does not smoke cigarettes.  He drinking less beer.  He denies drug abuse.       His daughter has type 1 diabetes mellitus.    The following portions of the patient's history were reviewed and updated as appropriate: allergies, current medications, past family history, past medical history, past social history, past surgical history, and problem list.    Review of Systems   Eyes:  Negative for visual disturbance.   Respiratory:  Negative for shortness of breath.    Cardiovascular:  Negative for chest pain and palpitations.  "  Gastrointestinal: Negative.    Genitourinary: Negative.    Musculoskeletal:  Negative for myalgias.   Neurological:  Negative for numbness.     Vitals:    06/06/24 0801   BP: 142/84   Pulse: 71   Temp: 96.9 °F (36.1 °C)   TempSrc: Temporal   SpO2: 96%   Weight: 113 kg (250 lb 3.2 oz)   Height: 188 cm (74.02\")      Objective   Physical Exam  Constitutional:       General: He is not in acute distress.     Appearance: Normal appearance. He is not ill-appearing or toxic-appearing.   Eyes:      General: No scleral icterus.        Right eye: No discharge.         Left eye: No discharge.   Neck:      Vascular: No carotid bruit.   Cardiovascular:      Rate and Rhythm: Normal rate and regular rhythm.      Pulses: Normal pulses.      Heart sounds: Normal heart sounds.   Pulmonary:      Effort: Pulmonary effort is normal.      Breath sounds: Normal breath sounds. No rales.   Chest:      Chest wall: No tenderness.   Abdominal:      General: Bowel sounds are normal.      Palpations: Abdomen is soft.      Tenderness: There is no right CVA tenderness or left CVA tenderness.   Musculoskeletal:      Right lower leg: No edema.      Left lower leg: No edema.      Comments: Feet warm.  No cyanosis or pedal edema.  No plantar ulcers.   Lymphadenopathy:      Cervical: No cervical adenopathy.   Neurological:      Mental Status: He is alert and oriented to person, place, and time.      Comments: Intact light touch in lower extremities.       Office Visit on 01/25/2024   Component Date Value Ref Range Status    Glucose 01/25/2024 118 (H)  65 - 99 mg/dL Final    BUN 01/25/2024 18  6 - 20 mg/dL Final    Creatinine 01/25/2024 1.10  0.76 - 1.27 mg/dL Final    EGFR Result 01/25/2024 86.5  >60.0 mL/min/1.73 Final    Comment: GFR Normal >60  Chronic Kidney Disease <60  Kidney Failure <15      BUN/Creatinine Ratio 01/25/2024 16.4  7.0 - 25.0 Final    Sodium 01/25/2024 141  136 - 145 mmol/L Final    Potassium 01/25/2024 4.5  3.5 - 5.2 mmol/L Final "    Chloride 01/25/2024 104  98 - 107 mmol/L Final    Total CO2 01/25/2024 26.2  22.0 - 29.0 mmol/L Final    Calcium 01/25/2024 9.8  8.6 - 10.5 mg/dL Final    Total Protein 01/25/2024 6.9  6.0 - 8.5 g/dL Final    Albumin 01/25/2024 4.9  3.5 - 5.2 g/dL Final    Globulin 01/25/2024 2.0  gm/dL Final    A/G Ratio 01/25/2024 2.5  g/dL Final    Total Bilirubin 01/25/2024 1.9 (H)  0.0 - 1.2 mg/dL Final    Alkaline Phosphatase 01/25/2024 50  39 - 117 U/L Final    AST (SGOT) 01/25/2024 26  1 - 40 U/L Final    ALT (SGPT) 01/25/2024 48 (H)  1 - 41 U/L Final    Total Cholesterol 01/25/2024 221 (H)  0 - 200 mg/dL Final    Comment: Cholesterol Reference Ranges  (U.S. Department of Health and Human Services ATP III  Classifications)  Desirable          <200 mg/dL  Borderline High    200-239 mg/dL  High Risk          >240 mg/dL  Triglyceride Reference Ranges  (U.S. Department of Health and Human Services ATP III  Classifications)  Normal           <150 mg/dL  Borderline High  150-199 mg/dL  High             200-499 mg/dL  Very High        >500 mg/dL  HDL Reference Ranges  (U.S. Department of Health and Human Services ATP III  Classifications)  Low     <40 mg/dl (major risk factor for CHD)  High    >60 mg/dl ('negative' risk factor for CHD)  LDL Reference Ranges  (U.S. Department of Health and Human Services ATP III  Classifications)  Optimal          <100 mg/dL  Near Optimal     100-129 mg/dL  Borderline High  130-159 mg/dL  High             160-189 mg/dL  Very High        >189 mg/dL      Triglycerides 01/25/2024 118  0 - 150 mg/dL Final    HDL Cholesterol 01/25/2024 48  40 - 60 mg/dL Final    VLDL Cholesterol Henok 01/25/2024 21  5 - 40 mg/dL Final    LDL Chol Calc (NIH) 01/25/2024 152 (H)  0 - 100 mg/dL Final    Hemoglobin A1C 01/25/2024 7.40 (H)  4.80 - 5.60 % Final    Comment: Hemoglobin A1C Ranges:  Increased Risk for Diabetes  5.7% to 6.4%  Diabetes                     >= 6.5%  Diabetic Goal                < 7.0%      TSH  01/25/2024 3.330  0.270 - 4.200 uIU/mL Final    Interpretation 01/25/2024 Note   Final    Supplemental report is available.    Unable to Void 01/25/2024 Comment   Final    Comment: The patient was not able to render a urine sample and has been  instructed to return for a urine collection at their earliest  convenience.  The urine testing that you have requested has  been deleted from this report.  When the patient returns and  provides a urine specimen, the urine testing will be performed  and separately reported.       Assessment & Plan   Diagnoses and all orders for this visit:    1. Type 1 diabetes mellitus without complication (Primary)  -     Comprehensive Metabolic Panel  -     Microalbumin / Creatinine Urine Ratio - Urine, Clean Catch  -     Hemoglobin A1c    2. Other hyperlipidemia  -     Lipid Panel    3. Primary hypothyroidism  -     TSH Rfx On Abnormal To Free T4      Discussed with patient about insulin pump.  Printed information was given to the patient.  He will let us know when he is ready to switch over to a pump.  He was advised to avoid overcorrection.    Continue pravastatin 20 mg every evening.    Continue levothyroxine 100 mcg/day.    Copy of my note sent to Dr. Cox.    Follow-up in 4 months.

## 2024-06-07 LAB
ALBUMIN SERPL-MCNC: 4.5 G/DL (ref 4.1–5.1)
ALBUMIN/CREAT UR: <2 MG/G CREAT (ref 0–29)
ALBUMIN/GLOB SERPL: 2.5 {RATIO} (ref 1.2–2.2)
ALP SERPL-CCNC: 46 IU/L (ref 44–121)
ALT SERPL-CCNC: 31 IU/L (ref 0–44)
AST SERPL-CCNC: 20 IU/L (ref 0–40)
BILIRUB SERPL-MCNC: 1.4 MG/DL (ref 0–1.2)
BUN SERPL-MCNC: 15 MG/DL (ref 6–24)
BUN/CREAT SERPL: 15 (ref 9–20)
CALCIUM SERPL-MCNC: 9.3 MG/DL (ref 8.7–10.2)
CHLORIDE SERPL-SCNC: 106 MMOL/L (ref 96–106)
CHOLEST SERPL-MCNC: 156 MG/DL (ref 100–199)
CO2 SERPL-SCNC: 22 MMOL/L (ref 20–29)
CREAT SERPL-MCNC: 1 MG/DL (ref 0.76–1.27)
CREAT UR-MCNC: 191.5 MG/DL
EGFRCR SERPLBLD CKD-EPI 2021: 96 ML/MIN/1.73
GLOBULIN SER CALC-MCNC: 1.8 G/DL (ref 1.5–4.5)
GLUCOSE SERPL-MCNC: 161 MG/DL (ref 70–99)
HBA1C MFR BLD: 7.2 % (ref 4.8–5.6)
HDLC SERPL-MCNC: 45 MG/DL
IMP & REVIEW OF LAB RESULTS: NORMAL
LDLC SERPL CALC-MCNC: 98 MG/DL (ref 0–99)
MICROALBUMIN UR-MCNC: <3 UG/ML
POTASSIUM SERPL-SCNC: 4.6 MMOL/L (ref 3.5–5.2)
PROT SERPL-MCNC: 6.3 G/DL (ref 6–8.5)
SODIUM SERPL-SCNC: 141 MMOL/L (ref 134–144)
TRIGL SERPL-MCNC: 66 MG/DL (ref 0–149)
TSH SERPL DL<=0.005 MIU/L-ACNC: 2.56 UIU/ML (ref 0.45–4.5)
VLDLC SERPL CALC-MCNC: 13 MG/DL (ref 5–40)

## 2024-06-09 NOTE — PROGRESS NOTES
Hemoglobin A1c slightly improved at 7.2%.  Normal urine microalbumin.  LDL improved at 98.  HDL 45.  Continue pravastatin 20 mg every evening.  Normal thyroid function tests.  Continue levothyroxine 100 mcg/day.  Copy of labs sent to Dr. Cox and to patient through [employer].

## 2024-07-03 ENCOUNTER — PRIOR AUTHORIZATION (OUTPATIENT)
Dept: ENDOCRINOLOGY | Age: 44
End: 2024-07-03
Payer: COMMERCIAL

## 2024-07-03 RX ORDER — PRAVASTATIN SODIUM 20 MG
20 TABLET ORAL NIGHTLY
Qty: 30 TABLET | Refills: 5 | Status: SHIPPED | OUTPATIENT
Start: 2024-07-03

## 2024-07-03 NOTE — TELEPHONE ENCOUNTER
Rx Refill Note  Requested Prescriptions     Pending Prescriptions Disp Refills    pravastatin (PRAVACHOL) 20 MG tablet 30 tablet 5     Sig: Take 1 tablet by mouth Every Night.      Last office visit with prescribing clinician: 6/6/2024   Last telemedicine visit with prescribing clinician: Visit date not found   Next office visit with prescribing clinician: 10/31/2024                         Would you like a call back once the refill request has been completed: [] Yes [] No    If the office needs to give you a call back, can they leave a voicemail: [] Yes [] No    Julia Riojas MA  07/03/24, 16:21 EDT     7/3 pa started on pravastain   KEY [id number]    Drug is covered by current benefit plan. No further PA activity needed

## 2024-08-02 DIAGNOSIS — E10.9 TYPE 1 DIABETES MELLITUS WITHOUT COMPLICATION: ICD-10-CM

## 2024-08-02 DIAGNOSIS — E78.49 OTHER HYPERLIPIDEMIA: Primary | ICD-10-CM

## 2024-08-02 RX ORDER — PRAVASTATIN SODIUM 20 MG
20 TABLET ORAL NIGHTLY
Qty: 90 TABLET | Refills: 2 | Status: SHIPPED | OUTPATIENT
Start: 2024-08-02

## 2024-08-02 RX ORDER — INSULIN DEGLUDEC 100 U/ML
INJECTION, SOLUTION SUBCUTANEOUS
Qty: 30 ML | Refills: 2 | Status: SHIPPED | OUTPATIENT
Start: 2024-08-02

## 2024-08-02 NOTE — TELEPHONE ENCOUNTER
Rx Refill Note  Requested Prescriptions     Pending Prescriptions Disp Refills    pravastatin (PRAVACHOL) 20 MG tablet 30 tablet 5     Sig: Take 1 tablet by mouth Every Night.      Last office visit with prescribing clinician: 6/6/2024   Last telemedicine visit with prescribing clinician: Visit date not found   Next office visit with prescribing clinician: 10/31/2024                         Would you like a call back once the refill request has been completed: [] Yes [] No    If the office needs to give you a call back, can they leave a voicemail: [] Yes [] No    Julia Riojas MA  08/02/24, 14:53 EDT

## 2024-08-02 NOTE — TELEPHONE ENCOUNTER
Rx Refill Note  Requested Prescriptions     Pending Prescriptions Disp Refills    insulin degludec (Tresiba FlexTouch) 100 UNIT/ML solution pen-injector injection [Pharmacy Med Name: TRESIBA FLEXTOUCH PEN 3ML 5'S 100U/ML] 30 mL 3     Sig: INJECT 28 UNITS EVERY MORNING. PRIME NEEDLE WITH 2 UNITS BEFORE EACH USE      Last office visit with prescribing clinician: 6/6/2024   Last telemedicine visit with prescribing clinician: Visit date not found   Next office visit with prescribing clinician: 10/31/2024                         Would you like a call back once the refill request has been completed: [] Yes [] No    If the office needs to give you a call back, can they leave a voicemail: [] Yes [] No    Elizabeth Hooks  08/02/24, 07:29 EDT

## 2024-09-23 DIAGNOSIS — E10.9 TYPE 1 DIABETES MELLITUS WITHOUT COMPLICATION: ICD-10-CM

## 2024-09-23 DIAGNOSIS — E03.9 PRIMARY HYPOTHYROIDISM: ICD-10-CM

## 2024-09-24 DIAGNOSIS — E03.9 PRIMARY HYPOTHYROIDISM: ICD-10-CM

## 2024-09-24 RX ORDER — LEVOTHYROXINE SODIUM 100 UG/1
TABLET ORAL
Qty: 90 TABLET | Refills: 2 | Status: SHIPPED | OUTPATIENT
Start: 2024-09-24

## 2024-09-24 RX ORDER — PROCHLORPERAZINE 25 MG/1
SUPPOSITORY RECTAL
Qty: 1 EACH | Refills: 3 | Status: SHIPPED | OUTPATIENT
Start: 2024-09-24

## 2024-10-14 RX ORDER — INSULIN LISPRO 200 [IU]/ML
INJECTION, SOLUTION SUBCUTANEOUS TAKE AS DIRECTED
Qty: 36 ML | Refills: 3 | Status: SHIPPED | OUTPATIENT
Start: 2024-10-14

## 2024-10-14 NOTE — TELEPHONE ENCOUNTER
Rx Refill Note  Requested Prescriptions     Pending Prescriptions Disp Refills    HumaLOG KwikPen 200 UNIT/ML solution pen-injector [Pharmacy Med Name: HUMALOG KWIKPEN 3ML 2'S 200U/ML] 36 mL 3     Sig: USE AS INSTRUCTED BY YOUR PRESCRIBER      Last office visit with prescribing clinician: 6/6/2024   Last telemedicine visit with prescribing clinician: Visit date not found   Next office visit with prescribing clinician: 10/31/2024                         Would you like a call back once the refill request has been completed: [] Yes [] No    If the office needs to give you a call back, can they leave a voicemail: [] Yes [] No    Elizabeth Hooks  10/14/24, 07:53 EDT

## 2024-10-31 ENCOUNTER — OFFICE VISIT (OUTPATIENT)
Dept: ENDOCRINOLOGY | Age: 44
End: 2024-10-31
Payer: COMMERCIAL

## 2024-10-31 VITALS
SYSTOLIC BLOOD PRESSURE: 113 MMHG | BODY MASS INDEX: 31.44 KG/M2 | TEMPERATURE: 97.8 F | WEIGHT: 245 LBS | HEART RATE: 75 BPM | DIASTOLIC BLOOD PRESSURE: 80 MMHG | OXYGEN SATURATION: 99 %

## 2024-10-31 DIAGNOSIS — E10.9 TYPE 1 DIABETES MELLITUS WITHOUT COMPLICATION: Primary | ICD-10-CM

## 2024-10-31 DIAGNOSIS — E78.5 HYPERLIPIDEMIA, UNSPECIFIED HYPERLIPIDEMIA TYPE: ICD-10-CM

## 2024-10-31 DIAGNOSIS — E03.9 PRIMARY HYPOTHYROIDISM: ICD-10-CM

## 2024-10-31 DIAGNOSIS — E78.49 OTHER HYPERLIPIDEMIA: ICD-10-CM

## 2024-10-31 LAB
ALBUMIN SERPL-MCNC: 4.4 G/DL (ref 3.5–5.2)
ALBUMIN/GLOB SERPL: 1.9 G/DL
ALP SERPL-CCNC: 50 U/L (ref 39–117)
ALT SERPL-CCNC: 43 U/L (ref 1–41)
AST SERPL-CCNC: 30 U/L (ref 1–40)
BILIRUB SERPL-MCNC: 1.5 MG/DL (ref 0–1.2)
BUN SERPL-MCNC: 19 MG/DL (ref 6–20)
BUN/CREAT SERPL: 17.1 (ref 7–25)
CALCIUM SERPL-MCNC: 9.2 MG/DL (ref 8.6–10.5)
CHLORIDE SERPL-SCNC: 105 MMOL/L (ref 98–107)
CHOLEST SERPL-MCNC: 191 MG/DL (ref 0–200)
CO2 SERPL-SCNC: 24.8 MMOL/L (ref 22–29)
CREAT SERPL-MCNC: 1.11 MG/DL (ref 0.76–1.27)
EGFRCR SERPLBLD CKD-EPI 2021: 85 ML/MIN/1.73
GLOBULIN SER CALC-MCNC: 2.3 GM/DL
GLUCOSE SERPL-MCNC: 144 MG/DL (ref 65–99)
HBA1C MFR BLD: 7.4 % (ref 4.8–5.6)
HDLC SERPL-MCNC: 45 MG/DL (ref 40–60)
IMP & REVIEW OF LAB RESULTS: NORMAL
LDLC SERPL CALC-MCNC: 127 MG/DL (ref 0–100)
POTASSIUM SERPL-SCNC: 4.6 MMOL/L (ref 3.5–5.2)
PROT SERPL-MCNC: 6.7 G/DL (ref 6–8.5)
SODIUM SERPL-SCNC: 141 MMOL/L (ref 136–145)
TRIGL SERPL-MCNC: 107 MG/DL (ref 0–150)
TSH SERPL DL<=0.005 MIU/L-ACNC: 3.83 UIU/ML (ref 0.27–4.2)
VLDLC SERPL CALC-MCNC: 19 MG/DL (ref 5–40)

## 2024-10-31 NOTE — PROGRESS NOTES
Subjective   Erasmo Kelley is a 44 y.o. male.     History of Present Illness        He was diagnosed to have diabetes mellitus in February 2018 with the 5-8 month history of increased thirst, polyuria, and weight loss of 35 pounds.  DOMINGO antibody was elevated at 689 units per mL.       He has lost 4 pounds since June 2024.  He exercise regularly.     He is on Tresiba 28 units every AM and Insulin lispro 1 unit per 14 gram CHO (14-18 units) with meals.  He has seen the diabetic educator.  He is using a Dexcom 6 sensor.  He has rare hypoglycemic episode.  His last meal was last night.     He is considering using a pump but does not want to use one with tubes.  His insurance will not cover Omnipod     Sensor data from October 18, 2024 to October 31, 2024 reviewed.  Average glucose 180 mg per DL.  Time in target 56%.    Time above target 44%.  Time above target 0%.  Patient has significant highs between 10 PM and 11 PM.  He has occasional hypoglycemia in the early morning.  He has postprandial hyperglycemia more prominent after supper.     His last eye examination was in 2023.  He has no retinopathy..   He denies any numbness, tingling or burning sensation in his hands or feet.  Urine microalbumin was normal in 6/24.     He has no history of hypertension.        He has hyperlipidemia and is on pravastatin 20 mg every PM.  He was taken off Crestor 5 mg/day because he had arthralgia of knee and myalgia while on it.     He has primary hypothyroidism with elevated thyroid peroxidase antibody.  He is on levothyroxine 100 mcg/day.      He does not smoke cigarettes.  He drinking less beer.  He denies drug abuse.       His daughter has type 1 diabetes mellitus.    The following portions of the patient's history were reviewed and updated as appropriate: allergies, current medications, past family history, past medical history, past social history, past surgical history, and problem list.    Review of Systems   Respiratory:   Negative for shortness of breath.    Cardiovascular:  Negative for chest pain and palpitations.   Gastrointestinal: Negative.    Endocrine: Negative.    Genitourinary: Negative.    Musculoskeletal:  Negative for myalgias.   Neurological:  Negative for numbness.     Vitals:    10/31/24 0807   BP: 113/80   Pulse: 75   Temp: 97.8 °F (36.6 °C)   TempSrc: Oral   SpO2: 99%   Weight: 111 kg (245 lb)      Objective   Physical Exam  Constitutional:       General: He is not in acute distress.     Appearance: Normal appearance. He is not ill-appearing, toxic-appearing or diaphoretic.   Eyes:      General: No scleral icterus.        Right eye: No discharge.         Left eye: No discharge.      Extraocular Movements: Extraocular movements intact.      Conjunctiva/sclera: Conjunctivae normal.   Cardiovascular:      Rate and Rhythm: Normal rate and regular rhythm.      Pulses: Normal pulses.      Heart sounds: Normal heart sounds.   Pulmonary:      Breath sounds: Normal breath sounds.   Abdominal:      Tenderness: There is no right CVA tenderness or left CVA tenderness.   Musculoskeletal:      Right lower leg: No edema.      Left lower leg: No edema.   Neurological:      Mental Status: He is alert and oriented to person, place, and time.       Office Visit on 06/06/2024   Component Date Value Ref Range Status    Glucose 06/06/2024 161 (H)  70 - 99 mg/dL Final    BUN 06/06/2024 15  6 - 24 mg/dL Final    Creatinine 06/06/2024 1.00  0.76 - 1.27 mg/dL Final    EGFR Result 06/06/2024 96  >59 mL/min/1.73 Final    BUN/Creatinine Ratio 06/06/2024 15  9 - 20 Final    Sodium 06/06/2024 141  134 - 144 mmol/L Final    Potassium 06/06/2024 4.6  3.5 - 5.2 mmol/L Final    Chloride 06/06/2024 106  96 - 106 mmol/L Final    Total CO2 06/06/2024 22  20 - 29 mmol/L Final    Calcium 06/06/2024 9.3  8.7 - 10.2 mg/dL Final    Total Protein 06/06/2024 6.3  6.0 - 8.5 g/dL Final    Albumin 06/06/2024 4.5  4.1 - 5.1 g/dL Final    Globulin 06/06/2024 1.8   1.5 - 4.5 g/dL Final    A/G Ratio 06/06/2024 2.5 (H)  1.2 - 2.2 Final    Total Bilirubin 06/06/2024 1.4 (H)  0.0 - 1.2 mg/dL Final    Alkaline Phosphatase 06/06/2024 46  44 - 121 IU/L Final    AST (SGOT) 06/06/2024 20  0 - 40 IU/L Final    ALT (SGPT) 06/06/2024 31  0 - 44 IU/L Final    Creatinine, Urine 06/06/2024 191.5  Not Estab. mg/dL Final    Microalbumin, Urine 06/06/2024 <3.0  Not Estab. ug/mL Final    Microalbumin/Creatinine Ratio 06/06/2024 <2  0 - 29 mg/g creat Final    Comment:                        Normal:                0 -  29                         Moderately increased: 30 - 300                         Severely increased:       >300      Hemoglobin A1C 06/06/2024 7.2 (H)  4.8 - 5.6 % Final    Comment:          Prediabetes: 5.7 - 6.4           Diabetes: >6.4           Glycemic control for adults with diabetes: <7.0      Total Cholesterol 06/06/2024 156  100 - 199 mg/dL Final    Triglycerides 06/06/2024 66  0 - 149 mg/dL Final    HDL Cholesterol 06/06/2024 45  >39 mg/dL Final    VLDL Cholesterol Henok 06/06/2024 13  5 - 40 mg/dL Final    LDL Chol Calc (NIH) 06/06/2024 98  0 - 99 mg/dL Final    TSH 06/06/2024 2.560  0.450 - 4.500 uIU/mL Final    Interpretation 06/06/2024 Note   Final    Supplemental report is available.     Assessment & Plan   Diagnoses and all orders for this visit:    1. Type 1 diabetes mellitus without complication (Primary)  -     Comprehensive Metabolic Panel  -     Hemoglobin A1c    2. Primary hypothyroidism  -     TSH Rfx On Abnormal To Free T4    3. Hyperlipidemia, unspecified hyperlipidemia type  -     Lipid Panel      Decrease Tresiba to 26 units every morning.  Continue mealtime lispro insulin.  Patient will check whether his insurance will cover OmniPod next year.    Continue pravastatin 20 mg/day.    Continue levothyroxine 100 mcg/day.    Flu vaccine this fall.    Copy of my note sent to Dr. Cox.    RTC 4 mos.

## 2024-11-01 ENCOUNTER — TELEPHONE (OUTPATIENT)
Dept: ENDOCRINOLOGY | Age: 44
End: 2024-11-01
Payer: COMMERCIAL

## 2024-11-01 RX ORDER — PRAVASTATIN SODIUM 40 MG
40 TABLET ORAL NIGHTLY
Qty: 90 TABLET | Refills: 1 | Status: SHIPPED | OUTPATIENT
Start: 2024-11-01

## 2024-11-01 NOTE — PROGRESS NOTES
Hemoglobin A1c slightly higher at 7.4%.  LDL higher at 127.  HDL 45.  Increase pravastatin to 40 mg every evening.  Prescription sent to pharmacy.  Normal thyroid function tests.  Continue levothyroxine 100 mcg/day.  Please notify patient of results and instructions.  Copy of labs sent to Dr. Cox

## 2024-12-09 DIAGNOSIS — E10.9 TYPE 1 DIABETES MELLITUS WITHOUT COMPLICATION: ICD-10-CM

## 2024-12-09 RX ORDER — INSULIN DEGLUDEC 100 U/ML
INJECTION, SOLUTION SUBCUTANEOUS
Qty: 26 ML | Refills: 2 | Status: SHIPPED | OUTPATIENT
Start: 2024-12-09 | End: 2024-12-12 | Stop reason: SDUPTHER

## 2024-12-09 NOTE — TELEPHONE ENCOUNTER
Incoming Refill Request      Medication requested (name and dose): [facility]    Pharmacy where request should be sent: [facility] [address]    Additional details provided by patient: having issues with his mail order. Asked if we can send a 1 time fill to local pharmacy. PATIENT IS ASKING FOR A CALL BACK ONCE IT IS SENT IN      Best call back number: [phone]    Does the patient have less than a 3 day supply:  [] Yes  [] No    Kenney Barone Rep  12/09/24, 10:43 EST

## 2024-12-12 DIAGNOSIS — E10.9 TYPE 1 DIABETES MELLITUS WITHOUT COMPLICATION: ICD-10-CM

## 2024-12-12 RX ORDER — INSULIN DEGLUDEC 100 U/ML
INJECTION, SOLUTION SUBCUTANEOUS
Qty: 30 ML | Refills: 2 | Status: SHIPPED | OUTPATIENT
Start: 2024-12-12

## 2024-12-13 DIAGNOSIS — E10.9 TYPE 1 DIABETES MELLITUS WITHOUT COMPLICATION: ICD-10-CM

## 2024-12-13 RX ORDER — INSULIN DEGLUDEC 100 U/ML
INJECTION, SOLUTION SUBCUTANEOUS
Qty: 30 ML | Refills: 2 | OUTPATIENT
Start: 2024-12-13

## 2024-12-13 NOTE — TELEPHONE ENCOUNTER
Rx Refill Note  Requested Prescriptions     Pending Prescriptions Disp Refills    insulin degludec (Tresiba FlexTouch) 100 UNIT/ML solution pen-injector injection 30 mL 2     Sig: INJECT 26 UNITS EVERY MORNING. PRIME NEEDLE WITH 2 UNITS BEFORE EACH USE      Last office visit with prescribing clinician: 10/31/2024   Last telemedicine visit with prescribing clinician: Visit date not found   Next office visit with prescribing clinician: 3/7/2025                         Would you like a call back once the refill request has been completed: [] Yes [] No    If the office needs to give you a call back, can they leave a voicemail: [] Yes [] No    Elizabeth Hooks  12/13/24, 08:27 EST

## 2024-12-16 RX ORDER — PROCHLORPERAZINE 25 MG/1
SUPPOSITORY RECTAL
Qty: 9 EACH | Refills: 3 | Status: SHIPPED | OUTPATIENT
Start: 2024-12-16

## 2024-12-16 NOTE — TELEPHONE ENCOUNTER
Rx Refill Note  Requested Prescriptions     Pending Prescriptions Disp Refills    Continuous Glucose Sensor (Dexcom G6 Sensor) [Pharmacy Med Name: DEXCOM G6 SENSOR 3'S] 9 each 3     Sig: USE 1 SENSOR EVERY 10 DAYS      Last office visit with prescribing clinician: 10/31/2024   Last telemedicine visit with prescribing clinician: Visit date not found   Next office visit with prescribing clinician: 3/7/2025                         Would you like a call back once the refill request has been completed: [] Yes [] No    If the office needs to give you a call back, can they leave a voicemail: [] Yes [] No    Elizabeth Hooks  12/16/24, 13:05 EST

## 2025-03-07 ENCOUNTER — OFFICE VISIT (OUTPATIENT)
Dept: ENDOCRINOLOGY | Age: 45
End: 2025-03-07
Payer: COMMERCIAL

## 2025-03-07 VITALS
HEART RATE: 73 BPM | HEIGHT: 74 IN | BODY MASS INDEX: 31.21 KG/M2 | WEIGHT: 243.2 LBS | DIASTOLIC BLOOD PRESSURE: 70 MMHG | OXYGEN SATURATION: 96 % | TEMPERATURE: 97.7 F | SYSTOLIC BLOOD PRESSURE: 132 MMHG

## 2025-03-07 DIAGNOSIS — E03.9 PRIMARY HYPOTHYROIDISM: ICD-10-CM

## 2025-03-07 DIAGNOSIS — E78.5 HYPERLIPIDEMIA, UNSPECIFIED HYPERLIPIDEMIA TYPE: ICD-10-CM

## 2025-03-07 DIAGNOSIS — E10.9 TYPE 1 DIABETES MELLITUS WITHOUT COMPLICATION: Primary | ICD-10-CM

## 2025-03-07 DIAGNOSIS — E55.9 VITAMIN D DEFICIENCY: ICD-10-CM

## 2025-03-07 LAB
25(OH)D3+25(OH)D2 SERPL-MCNC: 23.7 NG/ML (ref 30–100)
ALBUMIN SERPL-MCNC: 4.3 G/DL (ref 3.5–5.2)
ALBUMIN/GLOB SERPL: 1.7 G/DL
ALP SERPL-CCNC: 45 U/L (ref 39–117)
ALT SERPL-CCNC: 25 U/L (ref 1–41)
AST SERPL-CCNC: 17 U/L (ref 1–40)
BILIRUB SERPL-MCNC: 1.9 MG/DL (ref 0–1.2)
BUN SERPL-MCNC: 12 MG/DL (ref 6–20)
BUN/CREAT SERPL: 10.7 (ref 7–25)
CALCIUM SERPL-MCNC: 9.8 MG/DL (ref 8.6–10.5)
CHLORIDE SERPL-SCNC: 105 MMOL/L (ref 98–107)
CHOLEST SERPL-MCNC: 169 MG/DL (ref 0–200)
CO2 SERPL-SCNC: 26.9 MMOL/L (ref 22–29)
CREAT SERPL-MCNC: 1.12 MG/DL (ref 0.76–1.27)
EGFRCR SERPLBLD CKD-EPI 2021: 84.1 ML/MIN/1.73
GLOBULIN SER CALC-MCNC: 2.5 GM/DL
GLUCOSE SERPL-MCNC: 133 MG/DL (ref 65–99)
HBA1C MFR BLD: 7.1 % (ref 4.8–5.6)
HDLC SERPL-MCNC: 49 MG/DL (ref 40–60)
IMP & REVIEW OF LAB RESULTS: NORMAL
LDLC SERPL CALC-MCNC: 102 MG/DL (ref 0–100)
POTASSIUM SERPL-SCNC: 4.2 MMOL/L (ref 3.5–5.2)
PROT SERPL-MCNC: 6.8 G/DL (ref 6–8.5)
SODIUM SERPL-SCNC: 142 MMOL/L (ref 136–145)
TRIGL SERPL-MCNC: 97 MG/DL (ref 0–150)
TSH SERPL DL<=0.005 MIU/L-ACNC: 2.94 UIU/ML (ref 0.27–4.2)
VLDLC SERPL CALC-MCNC: 18 MG/DL (ref 5–40)

## 2025-03-07 NOTE — LETTER
March 7, 2025     Jamarcus Jones DO  [address] [place] [address]    Patient: Erasmo Kelley   YOB: 1980   Date of Visit: 3/7/2025     Dear Jamarcus Jones DO:       Thank you for referring Erasmo Kelley to me for evaluation. Below are the relevant portions of my assessment and plan of care.    If you have questions, please do not hesitate to call me. I look forward to following Erasmo along with you.         Sincerely,        Rusty Tomlinson MD        CC: No Recipients    Rusty Tomlinson MD  03/07/25 0905  Sign when Signing Visit  Subjective   Erasmo Kelley is a 44 y.o. male.     History of Present Illness     He was diagnosed to have diabetes mellitus in February 2018 with the 5-8 month history of increased thirst, polyuria, and weight loss of 35 pounds.  DOMINGO antibody was elevated at 689 units per mL.       He has no significant weight change since October 2024.  He exercises regularly.     He is on Tresiba 26 units every AM and Insulin lispro 1 unit per 14 gram CHO (14-18 units) with meals.  He has seen the diabetic educator.  He is using a Dexcom 6 sensor.  His last meal was last night.     He is considering using a pump but does not want to use one with tubes.  His insurance will not cover Omnipod.     Sensor data from February 21, 2025 through March 6, 2025 reviewed.  Average glucose 171 mg per DL.  GMI 7.4%.  Time in range 64%.  Time above range 36%.  Time below range 0.  He has occasional hypoglycemia in the early morning when he is active before bedtime.  He has intermittent postprandial hyperglycemia mostly after supper and lunch.     His last eye examination was in 2023.  He has no retinopathy..   He denies any numbness, tingling or burning sensation in his hands or feet.  Urine microalbumin was normal in 6/24.     He has no history of hypertension.        He has hyperlipidemia and is on pravastatin 40 mg every PM.  He was taken off Crestor 5 mg/day  "because he had arthralgia of knee and myalgia while on it.     He has primary hypothyroidism with elevated thyroid peroxidase antibody.  He is on levothyroxine 100 mcg/day.     He has vitamin D deficiency and is not taking any supplement.     He does not smoke cigarettes.  He drinking less beer.  He denies drug abuse.       His daughter has type 1 diabetes mellitus.    The following portions of the patient's history were reviewed and updated as appropriate: allergies, current medications, past family history, past medical history, past social history, past surgical history, and problem list.    Review of Systems   Eyes:  Negative for visual disturbance.   Respiratory:  Negative for shortness of breath.    Cardiovascular:  Negative for chest pain and palpitations.   Gastrointestinal: Negative.    Genitourinary: Negative.    Musculoskeletal: Negative.    Neurological:  Negative for numbness.   Hematological:  Negative for adenopathy. Does not bruise/bleed easily.     Vitals:    03/07/25 0824   BP: 132/70   Pulse: 73   Temp: 97.7 °F (36.5 °C)   TempSrc: Oral   SpO2: 96%   Weight: 110 kg (243 lb 3.2 oz)   Height: 188 cm (74.02\")      Objective   Physical Exam  Constitutional:       General: He is not in acute distress.     Appearance: Normal appearance. He is not ill-appearing, toxic-appearing or diaphoretic.   Eyes:      General: No scleral icterus.        Right eye: No discharge.         Left eye: No discharge.   Cardiovascular:      Rate and Rhythm: Normal rate and regular rhythm.      Heart sounds: Normal heart sounds. No murmur heard.     No friction rub. No gallop.   Pulmonary:      Breath sounds: Normal breath sounds. No rales.   Chest:      Chest wall: No tenderness.   Abdominal:      General: Bowel sounds are normal.      Palpations: Abdomen is soft.      Tenderness: There is no right CVA tenderness or left CVA tenderness.   Musculoskeletal:      Comments: Feet warm.  No cyanosis, pedal edema or clubbing.  No " no plantar ulcers.   Neurological:      Mental Status: He is alert and oriented to person, place, and time.      Comments: Intact light touch in lower extremities.     Office Visit on 10/31/2024   Component Date Value Ref Range Status   • Glucose 10/31/2024 144 (H)  65 - 99 mg/dL Final   • BUN 10/31/2024 19  6 - 20 mg/dL Final   • Creatinine 10/31/2024 1.11  0.76 - 1.27 mg/dL Final   • EGFR Result 10/31/2024 85.0  >60.0 mL/min/1.73 Final    Comment: GFR Normal >60  Chronic Kidney Disease <60  Kidney Failure <15     • BUN/Creatinine Ratio 10/31/2024 17.1  7.0 - 25.0 Final   • Sodium 10/31/2024 141  136 - 145 mmol/L Final   • Potassium 10/31/2024 4.6  3.5 - 5.2 mmol/L Final   • Chloride 10/31/2024 105  98 - 107 mmol/L Final   • Total CO2 10/31/2024 24.8  22.0 - 29.0 mmol/L Final   • Calcium 10/31/2024 9.2  8.6 - 10.5 mg/dL Final   • Total Protein 10/31/2024 6.7  6.0 - 8.5 g/dL Final   • Albumin 10/31/2024 4.4  3.5 - 5.2 g/dL Final   • Globulin 10/31/2024 2.3  gm/dL Final   • A/G Ratio 10/31/2024 1.9  g/dL Final   • Total Bilirubin 10/31/2024 1.5 (H)  0.0 - 1.2 mg/dL Final   • Alkaline Phosphatase 10/31/2024 50  39 - 117 U/L Final   • AST (SGOT) 10/31/2024 30  1 - 40 U/L Final   • ALT (SGPT) 10/31/2024 43 (H)  1 - 41 U/L Final   • Hemoglobin A1C 10/31/2024 7.40 (H)  4.80 - 5.60 % Final    Comment: Hemoglobin A1C Ranges:  Increased Risk for Diabetes  5.7% to 6.4%  Diabetes                     >= 6.5%  Diabetic Goal                < 7.0%     • Total Cholesterol 10/31/2024 191  0 - 200 mg/dL Final    Comment: Cholesterol Reference Ranges  (U.S. Department of Health and Human Services ATP III  Classifications)  Desirable          <200 mg/dL  Borderline High    200-239 mg/dL  High Risk          >240 mg/dL  Triglyceride Reference Ranges  (U.S. Department of Health and Human Services ATP III  Classifications)  Normal           <150 mg/dL  Borderline High  150-199 mg/dL  High             200-499 mg/dL  Very High        >500  mg/dL  HDL Reference Ranges  (U.S. Department of Health and Human Services ATP III  Classifications)  Low     <40 mg/dl (major risk factor for CHD)  High    >60 mg/dl ('negative' risk factor for CHD)  LDL Reference Ranges  (U.S. Department of Health and Human Services ATP III  Classifications)  Optimal          <100 mg/dL  Near Optimal     100-129 mg/dL  Borderline High  130-159 mg/dL  High             160-189 mg/dL  Very High        >189 mg/dL     • Triglycerides 10/31/2024 107  0 - 150 mg/dL Final   • HDL Cholesterol 10/31/2024 45  40 - 60 mg/dL Final   • VLDL Cholesterol Henok 10/31/2024 19  5 - 40 mg/dL Final   • LDL Chol Calc (NIH) 10/31/2024 127 (H)  0 - 100 mg/dL Final   • TSH 10/31/2024 3.830  0.270 - 4.200 uIU/mL Final   • Interpretation 10/31/2024 Note   Final    Supplemental report is available.     Assessment & Plan   Diagnoses and all orders for this visit:    1. Type 1 diabetes mellitus without complication (Primary)  -     Comprehensive Metabolic Panel  -     Hemoglobin A1c    2. Primary hypothyroidism  -     TSH Rfx On Abnormal To Free T4    3. Vitamin D deficiency  -     Vitamin D,25-Hydroxy    4. Hyperlipidemia, unspecified hyperlipidemia type  -     Lipid Panel      Continue Tresiba and lispro insulin.  Patient will check if OmniPod will be covered through his insurance.    Continue pravastatin 40 mg every evening.    Continue levothyroxine 100 mcg/day.    Check vitamin D levels.    Copy my note sent to Dr. Jamarcus Jones    RTC 4 mos

## 2025-03-07 NOTE — PROGRESS NOTES
Subjective   Erasmo Kelley is a 44 y.o. male.     History of Present Illness     He was diagnosed to have diabetes mellitus in February 2018 with the 5-8 month history of increased thirst, polyuria, and weight loss of 35 pounds.  DOMINGO antibody was elevated at 689 units per mL.       He has no significant weight change since October 2024.  He exercises regularly.     He is on Tresiba 26 units every AM and Insulin lispro 1 unit per 14 gram CHO (14-18 units) with meals.  He has seen the diabetic educator.  He is using a Dexcom 6 sensor.  His last meal was last night.     He is considering using a pump but does not want to use one with tubes.  His insurance will not cover Omnipod.     Sensor data from February 21, 2025 through March 6, 2025 reviewed.  Average glucose 171 mg per DL.  GMI 7.4%.  Time in range 64%.  Time above range 36%.  Time below range 0.  He has occasional hypoglycemia in the early morning when he is active before bedtime.  He has intermittent postprandial hyperglycemia mostly after supper and lunch.     His last eye examination was in 2023.  He has no retinopathy..   He denies any numbness, tingling or burning sensation in his hands or feet.  Urine microalbumin was normal in 6/24.     He has no history of hypertension.        He has hyperlipidemia and is on pravastatin 40 mg every PM.  He was taken off Crestor 5 mg/day because he had arthralgia of knee and myalgia while on it.     He has primary hypothyroidism with elevated thyroid peroxidase antibody.  He is on levothyroxine 100 mcg/day.     He has vitamin D deficiency and is not taking any supplement.     He does not smoke cigarettes.  He drinking less beer.  He denies drug abuse.       His daughter has type 1 diabetes mellitus.    The following portions of the patient's history were reviewed and updated as appropriate: allergies, current medications, past family history, past medical history, past social history, past surgical history, and  "problem list.    Review of Systems   Eyes:  Negative for visual disturbance.   Respiratory:  Negative for shortness of breath.    Cardiovascular:  Negative for chest pain and palpitations.   Gastrointestinal: Negative.    Genitourinary: Negative.    Musculoskeletal: Negative.    Neurological:  Negative for numbness.   Hematological:  Negative for adenopathy. Does not bruise/bleed easily.     Vitals:    03/07/25 0824   BP: 132/70   Pulse: 73   Temp: 97.7 °F (36.5 °C)   TempSrc: Oral   SpO2: 96%   Weight: 110 kg (243 lb 3.2 oz)   Height: 188 cm (74.02\")      Objective   Physical Exam  Constitutional:       General: He is not in acute distress.     Appearance: Normal appearance. He is not ill-appearing, toxic-appearing or diaphoretic.   Eyes:      General: No scleral icterus.        Right eye: No discharge.         Left eye: No discharge.   Cardiovascular:      Rate and Rhythm: Normal rate and regular rhythm.      Heart sounds: Normal heart sounds. No murmur heard.     No friction rub. No gallop.   Pulmonary:      Breath sounds: Normal breath sounds. No rales.   Chest:      Chest wall: No tenderness.   Abdominal:      General: Bowel sounds are normal.      Palpations: Abdomen is soft.      Tenderness: There is no right CVA tenderness or left CVA tenderness.   Musculoskeletal:      Comments: Feet warm.  No cyanosis, pedal edema or clubbing.  No plantar ulcers.   Neurological:      Mental Status: He is alert and oriented to person, place, and time.      Comments: Intact light touch in lower extremities.     Office Visit on 10/31/2024   Component Date Value Ref Range Status    Glucose 10/31/2024 144 (H)  65 - 99 mg/dL Final    BUN 10/31/2024 19  6 - 20 mg/dL Final    Creatinine 10/31/2024 1.11  0.76 - 1.27 mg/dL Final    EGFR Result 10/31/2024 85.0  >60.0 mL/min/1.73 Final    Comment: GFR Normal >60  Chronic Kidney Disease <60  Kidney Failure <15      BUN/Creatinine Ratio 10/31/2024 17.1  7.0 - 25.0 Final    Sodium " 10/31/2024 141  136 - 145 mmol/L Final    Potassium 10/31/2024 4.6  3.5 - 5.2 mmol/L Final    Chloride 10/31/2024 105  98 - 107 mmol/L Final    Total CO2 10/31/2024 24.8  22.0 - 29.0 mmol/L Final    Calcium 10/31/2024 9.2  8.6 - 10.5 mg/dL Final    Total Protein 10/31/2024 6.7  6.0 - 8.5 g/dL Final    Albumin 10/31/2024 4.4  3.5 - 5.2 g/dL Final    Globulin 10/31/2024 2.3  gm/dL Final    A/G Ratio 10/31/2024 1.9  g/dL Final    Total Bilirubin 10/31/2024 1.5 (H)  0.0 - 1.2 mg/dL Final    Alkaline Phosphatase 10/31/2024 50  39 - 117 U/L Final    AST (SGOT) 10/31/2024 30  1 - 40 U/L Final    ALT (SGPT) 10/31/2024 43 (H)  1 - 41 U/L Final    Hemoglobin A1C 10/31/2024 7.40 (H)  4.80 - 5.60 % Final    Comment: Hemoglobin A1C Ranges:  Increased Risk for Diabetes  5.7% to 6.4%  Diabetes                     >= 6.5%  Diabetic Goal                < 7.0%      Total Cholesterol 10/31/2024 191  0 - 200 mg/dL Final    Comment: Cholesterol Reference Ranges  (U.S. Department of Health and Human Services ATP III  Classifications)  Desirable          <200 mg/dL  Borderline High    200-239 mg/dL  High Risk          >240 mg/dL  Triglyceride Reference Ranges  (U.S. Department of Health and Human Services ATP III  Classifications)  Normal           <150 mg/dL  Borderline High  150-199 mg/dL  High             200-499 mg/dL  Very High        >500 mg/dL  HDL Reference Ranges  (U.S. Department of Health and Human Services ATP III  Classifications)  Low     <40 mg/dl (major risk factor for CHD)  High    >60 mg/dl ('negative' risk factor for CHD)  LDL Reference Ranges  (U.S. Department of Health and Human Services ATP III  Classifications)  Optimal          <100 mg/dL  Near Optimal     100-129 mg/dL  Borderline High  130-159 mg/dL  High             160-189 mg/dL  Very High        >189 mg/dL      Triglycerides 10/31/2024 107  0 - 150 mg/dL Final    HDL Cholesterol 10/31/2024 45  40 - 60 mg/dL Final    VLDL Cholesterol Henok 10/31/2024 19  5 - 40  mg/dL Final    LDL Chol Calc ([facility]) 10/31/2024 127 (H)  0 - 100 mg/dL Final    TSH 10/31/2024 3.830  0.270 - 4.200 uIU/mL Final    Interpretation 10/31/2024 Note   Final    Supplemental report is available.     Assessment & Plan   Diagnoses and all orders for this visit:    1. Type 1 diabetes mellitus without complication (Primary)  -     Comprehensive Metabolic Panel  -     Hemoglobin A1c    2. Primary hypothyroidism  -     TSH Rfx On Abnormal To Free T4    3. Vitamin D deficiency  -     Vitamin D,25-Hydroxy    4. Hyperlipidemia, unspecified hyperlipidemia type  -     Lipid Panel      Continue Tresiba and lispro insulin.  Patient will check if OmniPod will be covered through his insurance.    Continue pravastatin 40 mg every evening.    Continue levothyroxine 100 mcg/day.    Check vitamin D levels.    Copy my note sent to Dr. Jamarcus Jones    RTC 4 mos

## 2025-06-30 ENCOUNTER — TELEPHONE (OUTPATIENT)
Dept: ENDOCRINOLOGY | Age: 45
End: 2025-06-30
Payer: COMMERCIAL

## 2025-06-30 DIAGNOSIS — E10.9 TYPE 1 DIABETES MELLITUS WITHOUT COMPLICATION: ICD-10-CM

## 2025-06-30 RX ORDER — INSULIN DEGLUDEC 100 U/ML
INJECTION, SOLUTION SUBCUTANEOUS
Qty: 15 ML | Refills: 5 | Status: SHIPPED | OUTPATIENT
Start: 2025-06-30 | End: 2025-07-01

## 2025-06-30 NOTE — TELEPHONE ENCOUNTER
"Provider: YSON     Caller: Erasmo Kelley \"JANET\"    Relationship to Patient: Self    Pharmacy: [facility]     Phone Number: [phone]     Reason for Call: PATIENT'S PRESCRIPTION INSURANCE HAS SWITCHED TO [facility], AND THEY DO NOT COVER TRESIBA. PATIENT ONLY HAS ABOUT 6 DAYS LEFT OF MEDICATION AND IS NEEDING AN ALTERNATIVE SENT TO [facility] IN THE [place] ON [address] SO HE DOESN'T RUN OUT, AND THEN THE PRESCRIPTION WITH REFILLS SENT IN TO [facility].   PLEASE CALL TO ADVISE   "

## 2025-07-01 DIAGNOSIS — E10.9 TYPE 1 DIABETES MELLITUS WITHOUT COMPLICATION: ICD-10-CM

## 2025-07-01 RX ORDER — INSULIN DEGLUDEC 100 U/ML
INJECTION, SOLUTION SUBCUTANEOUS
Qty: 30 ML | Refills: 2 | Status: SHIPPED | OUTPATIENT
Start: 2025-07-01

## 2025-07-01 RX ORDER — INSULIN DEGLUDEC 100 U/ML
INJECTION, SOLUTION SUBCUTANEOUS
Qty: 15 ML | Refills: 5 | Status: SHIPPED | OUTPATIENT
Start: 2025-07-01

## 2025-07-01 NOTE — TELEPHONE ENCOUNTER
Called and spoke with pt and he stated that his insurance changed that the rx has to [facility] Pt also asked if a 1 month supply could be sent to his local pharmacy and then another rx be sent to [facility]. Please advise.

## 2025-07-01 NOTE — TELEPHONE ENCOUNTER
Prescriptions for Tresiba U100 was sent to [facility] pharmacy and [facility].

## 2025-07-02 NOTE — TELEPHONE ENCOUNTER
Left pt a detailed message that rx was sent in. Advised to return call with any other questions or concerns.

## 2025-07-09 ENCOUNTER — TELEPHONE (OUTPATIENT)
Dept: ENDOCRINOLOGY | Age: 45
End: 2025-07-09

## 2025-07-09 RX ORDER — INSULIN ASPART 100 [IU]/ML
INJECTION, SOLUTION INTRAVENOUS; SUBCUTANEOUS
Qty: 60 ML | Refills: 2 | Status: SHIPPED | OUTPATIENT
Start: 2025-07-09

## 2025-07-09 RX ORDER — INSULIN DEGLUDEC 100 U/ML
INJECTION, SOLUTION SUBCUTANEOUS
Qty: 30 ML | Refills: 2 | Status: SHIPPED | OUTPATIENT
Start: 2025-07-09

## 2025-08-15 RX ORDER — LEVOTHYROXINE SODIUM 100 MCG
TABLET ORAL
Qty: 90 TABLET | Refills: 2 | Status: SHIPPED | OUTPATIENT
Start: 2025-08-15